# Patient Record
Sex: FEMALE | Race: WHITE | NOT HISPANIC OR LATINO | Employment: OTHER | URBAN - METROPOLITAN AREA
[De-identification: names, ages, dates, MRNs, and addresses within clinical notes are randomized per-mention and may not be internally consistent; named-entity substitution may affect disease eponyms.]

---

## 2024-09-29 ENCOUNTER — APPOINTMENT (OUTPATIENT)
Dept: CT IMAGING | Facility: CLINIC | Age: 69
DRG: 193 | End: 2024-09-29
Attending: STUDENT IN AN ORGANIZED HEALTH CARE EDUCATION/TRAINING PROGRAM
Payer: COMMERCIAL

## 2024-09-29 ENCOUNTER — HOSPITAL ENCOUNTER (INPATIENT)
Facility: CLINIC | Age: 69
LOS: 3 days | Discharge: HOME HEALTH | DRG: 193 | End: 2024-10-03
Attending: STUDENT IN AN ORGANIZED HEALTH CARE EDUCATION/TRAINING PROGRAM | Admitting: INTERNAL MEDICINE
Payer: COMMERCIAL

## 2024-09-29 DIAGNOSIS — A41.9 SEPSIS, DUE TO UNSPECIFIED ORGANISM, UNSPECIFIED WHETHER ACUTE ORGAN DYSFUNCTION PRESENT (H): ICD-10-CM

## 2024-09-29 DIAGNOSIS — R41.82 ALTERED MENTAL STATUS, UNSPECIFIED ALTERED MENTAL STATUS TYPE: ICD-10-CM

## 2024-09-29 DIAGNOSIS — Z96.651 S/P TOTAL KNEE ARTHROPLASTY, RIGHT: Primary | ICD-10-CM

## 2024-09-29 DIAGNOSIS — J18.9 COMMUNITY ACQUIRED PNEUMONIA OF LEFT UPPER LOBE OF LUNG: ICD-10-CM

## 2024-09-29 LAB
ALBUMIN SERPL BCG-MCNC: 4 G/DL (ref 3.5–5.2)
ALBUMIN UR-MCNC: NEGATIVE MG/DL
ALP SERPL-CCNC: 94 U/L (ref 40–150)
ALT SERPL W P-5'-P-CCNC: 12 U/L (ref 0–50)
ANION GAP SERPL CALCULATED.3IONS-SCNC: 15 MMOL/L (ref 7–15)
APPEARANCE UR: CLEAR
AST SERPL W P-5'-P-CCNC: 18 U/L (ref 0–45)
BASOPHILS # BLD AUTO: 0 10E3/UL (ref 0–0.2)
BASOPHILS NFR BLD AUTO: 1 %
BILIRUB SERPL-MCNC: 1.1 MG/DL
BILIRUB UR QL STRIP: NEGATIVE
BUN SERPL-MCNC: 13.8 MG/DL (ref 8–23)
CALCIUM SERPL-MCNC: 9.1 MG/DL (ref 8.8–10.4)
CHLORIDE SERPL-SCNC: 100 MMOL/L (ref 98–107)
COLOR UR AUTO: YELLOW
CREAT SERPL-MCNC: 0.85 MG/DL (ref 0.51–0.95)
CRP SERPL-MCNC: 53.02 MG/L
EGFRCR SERPLBLD CKD-EPI 2021: 73 ML/MIN/1.73M2
EOSINOPHIL # BLD AUTO: 0.2 10E3/UL (ref 0–0.7)
EOSINOPHIL NFR BLD AUTO: 3 %
ERYTHROCYTE [DISTWIDTH] IN BLOOD BY AUTOMATED COUNT: 12.7 % (ref 10–15)
ERYTHROCYTE [SEDIMENTATION RATE] IN BLOOD BY WESTERGREN METHOD: 42 MM/HR (ref 0–30)
GLUCOSE SERPL-MCNC: 155 MG/DL (ref 70–99)
GLUCOSE UR STRIP-MCNC: NEGATIVE MG/DL
HCO3 SERPL-SCNC: 23 MMOL/L (ref 22–29)
HCT VFR BLD AUTO: 36.7 % (ref 35–47)
HGB BLD-MCNC: 12.9 G/DL (ref 11.7–15.7)
HGB UR QL STRIP: ABNORMAL
HOLD SPECIMEN: NORMAL
HYALINE CASTS: 3 /LPF
IMM GRANULOCYTES # BLD: 0 10E3/UL
IMM GRANULOCYTES NFR BLD: 1 %
KETONES UR STRIP-MCNC: 5 MG/DL
LACTATE SERPL-SCNC: 2.7 MMOL/L (ref 0.7–2)
LEUKOCYTE ESTERASE UR QL STRIP: NEGATIVE
LYMPHOCYTES # BLD AUTO: 1.4 10E3/UL (ref 0.8–5.3)
LYMPHOCYTES NFR BLD AUTO: 18 %
MAGNESIUM SERPL-MCNC: 1.7 MG/DL (ref 1.7–2.3)
MCH RBC QN AUTO: 31.7 PG (ref 26.5–33)
MCHC RBC AUTO-ENTMCNC: 35.1 G/DL (ref 31.5–36.5)
MCV RBC AUTO: 90 FL (ref 78–100)
MONOCYTES # BLD AUTO: 0.5 10E3/UL (ref 0–1.3)
MONOCYTES NFR BLD AUTO: 7 %
MUCOUS THREADS #/AREA URNS LPF: PRESENT /LPF
NEUTROPHILS # BLD AUTO: 5.3 10E3/UL (ref 1.6–8.3)
NEUTROPHILS NFR BLD AUTO: 71 %
NITRATE UR QL: NEGATIVE
NRBC # BLD AUTO: 0 10E3/UL
NRBC BLD AUTO-RTO: 0 /100
PH UR STRIP: 7 [PH] (ref 5–7)
PLATELET # BLD AUTO: 261 10E3/UL (ref 150–450)
POTASSIUM SERPL-SCNC: 3.7 MMOL/L (ref 3.4–5.3)
PROT SERPL-MCNC: 6.9 G/DL (ref 6.4–8.3)
RBC # BLD AUTO: 4.07 10E6/UL (ref 3.8–5.2)
RBC URINE: 7 /HPF
SODIUM SERPL-SCNC: 138 MMOL/L (ref 135–145)
SP GR UR STRIP: 1.01 (ref 1–1.03)
TROPONIN T SERPL HS-MCNC: 8 NG/L
TSH SERPL DL<=0.005 MIU/L-ACNC: 3.24 UIU/ML (ref 0.3–4.2)
UROBILINOGEN UR STRIP-MCNC: 2 MG/DL
WBC # BLD AUTO: 7.5 10E3/UL (ref 4–11)
WBC URINE: 1 /HPF

## 2024-09-29 PROCEDURE — 99285 EMERGENCY DEPT VISIT HI MDM: CPT | Mod: 25 | Performed by: STUDENT IN AN ORGANIZED HEALTH CARE EDUCATION/TRAINING PROGRAM

## 2024-09-29 PROCEDURE — 80053 COMPREHEN METABOLIC PANEL: CPT | Performed by: STUDENT IN AN ORGANIZED HEALTH CARE EDUCATION/TRAINING PROGRAM

## 2024-09-29 PROCEDURE — 81001 URINALYSIS AUTO W/SCOPE: CPT | Performed by: STUDENT IN AN ORGANIZED HEALTH CARE EDUCATION/TRAINING PROGRAM

## 2024-09-29 PROCEDURE — 70450 CT HEAD/BRAIN W/O DYE: CPT

## 2024-09-29 PROCEDURE — 74177 CT ABD & PELVIS W/CONTRAST: CPT

## 2024-09-29 PROCEDURE — 250N000011 HC RX IP 250 OP 636: Performed by: FAMILY MEDICINE

## 2024-09-29 PROCEDURE — 84443 ASSAY THYROID STIM HORMONE: CPT | Performed by: STUDENT IN AN ORGANIZED HEALTH CARE EDUCATION/TRAINING PROGRAM

## 2024-09-29 PROCEDURE — 36415 COLL VENOUS BLD VENIPUNCTURE: CPT | Performed by: STUDENT IN AN ORGANIZED HEALTH CARE EDUCATION/TRAINING PROGRAM

## 2024-09-29 PROCEDURE — 83735 ASSAY OF MAGNESIUM: CPT | Performed by: STUDENT IN AN ORGANIZED HEALTH CARE EDUCATION/TRAINING PROGRAM

## 2024-09-29 PROCEDURE — 250N000009 HC RX 250: Performed by: STUDENT IN AN ORGANIZED HEALTH CARE EDUCATION/TRAINING PROGRAM

## 2024-09-29 PROCEDURE — 96375 TX/PRO/DX INJ NEW DRUG ADDON: CPT

## 2024-09-29 PROCEDURE — 83605 ASSAY OF LACTIC ACID: CPT | Performed by: STUDENT IN AN ORGANIZED HEALTH CARE EDUCATION/TRAINING PROGRAM

## 2024-09-29 PROCEDURE — 70496 CT ANGIOGRAPHY HEAD: CPT

## 2024-09-29 PROCEDURE — 99292 CRITICAL CARE ADDL 30 MIN: CPT

## 2024-09-29 PROCEDURE — 250N000011 HC RX IP 250 OP 636: Performed by: STUDENT IN AN ORGANIZED HEALTH CARE EDUCATION/TRAINING PROGRAM

## 2024-09-29 PROCEDURE — 96365 THER/PROPH/DIAG IV INF INIT: CPT | Mod: 59

## 2024-09-29 PROCEDURE — 99291 CRITICAL CARE FIRST HOUR: CPT | Mod: 25

## 2024-09-29 PROCEDURE — 85652 RBC SED RATE AUTOMATED: CPT | Performed by: STUDENT IN AN ORGANIZED HEALTH CARE EDUCATION/TRAINING PROGRAM

## 2024-09-29 PROCEDURE — 86140 C-REACTIVE PROTEIN: CPT | Performed by: STUDENT IN AN ORGANIZED HEALTH CARE EDUCATION/TRAINING PROGRAM

## 2024-09-29 PROCEDURE — 258N000003 HC RX IP 258 OP 636: Performed by: STUDENT IN AN ORGANIZED HEALTH CARE EDUCATION/TRAINING PROGRAM

## 2024-09-29 PROCEDURE — 84484 ASSAY OF TROPONIN QUANT: CPT | Performed by: STUDENT IN AN ORGANIZED HEALTH CARE EDUCATION/TRAINING PROGRAM

## 2024-09-29 PROCEDURE — 87040 BLOOD CULTURE FOR BACTERIA: CPT | Performed by: STUDENT IN AN ORGANIZED HEALTH CARE EDUCATION/TRAINING PROGRAM

## 2024-09-29 PROCEDURE — 85025 COMPLETE CBC W/AUTO DIFF WBC: CPT | Performed by: STUDENT IN AN ORGANIZED HEALTH CARE EDUCATION/TRAINING PROGRAM

## 2024-09-29 PROCEDURE — 96361 HYDRATE IV INFUSION ADD-ON: CPT

## 2024-09-29 PROCEDURE — 96376 TX/PRO/DX INJ SAME DRUG ADON: CPT

## 2024-09-29 RX ORDER — LORAZEPAM 2 MG/ML
1 INJECTION INTRAMUSCULAR ONCE
Status: COMPLETED | OUTPATIENT
Start: 2024-09-29 | End: 2024-09-29

## 2024-09-29 RX ORDER — METOPROLOL TARTRATE 1 MG/ML
5 INJECTION, SOLUTION INTRAVENOUS ONCE
Status: DISCONTINUED | OUTPATIENT
Start: 2024-09-29 | End: 2024-09-29

## 2024-09-29 RX ORDER — OXYCODONE HYDROCHLORIDE 5 MG/1
5 TABLET ORAL EVERY 6 HOURS PRN
Status: ON HOLD | COMMUNITY
Start: 2024-09-26 | End: 2024-10-03

## 2024-09-29 RX ORDER — ATORVASTATIN CALCIUM 20 MG/1
1 TABLET, FILM COATED ORAL AT BEDTIME
COMMUNITY
Start: 2024-09-09

## 2024-09-29 RX ORDER — IOPAMIDOL 755 MG/ML
500 INJECTION, SOLUTION INTRAVASCULAR ONCE
Status: COMPLETED | OUTPATIENT
Start: 2024-09-29 | End: 2024-09-29

## 2024-09-29 RX ORDER — AMLODIPINE BESYLATE 10 MG/1
1 TABLET ORAL DAILY
COMMUNITY
Start: 2024-09-09

## 2024-09-29 RX ORDER — PIPERACILLIN SODIUM, TAZOBACTAM SODIUM 4; .5 G/20ML; G/20ML
4.5 INJECTION, POWDER, LYOPHILIZED, FOR SOLUTION INTRAVENOUS EVERY 6 HOURS
Status: DISCONTINUED | OUTPATIENT
Start: 2024-09-29 | End: 2024-09-30

## 2024-09-29 RX ORDER — ASPIRIN 81 MG/1
81 TABLET ORAL DAILY
Status: ON HOLD | COMMUNITY
End: 2024-10-03

## 2024-09-29 RX ORDER — METOPROLOL SUCCINATE 100 MG/1
150 TABLET, EXTENDED RELEASE ORAL DAILY
COMMUNITY
Start: 2024-09-09

## 2024-09-29 RX ORDER — HYDROXYZINE HYDROCHLORIDE 25 MG/1
25 TABLET, FILM COATED ORAL EVERY 6 HOURS PRN
Status: ON HOLD | COMMUNITY
Start: 2024-09-26 | End: 2024-10-03

## 2024-09-29 RX ADMIN — PIPERACILLIN AND TAZOBACTAM 4.5 G: 4; .5 INJECTION, POWDER, FOR SOLUTION INTRAVENOUS at 23:31

## 2024-09-29 RX ADMIN — LORAZEPAM 1 MG: 2 INJECTION INTRAMUSCULAR; INTRAVENOUS at 22:05

## 2024-09-29 RX ADMIN — SODIUM CHLORIDE 1000 ML: 9 INJECTION, SOLUTION INTRAVENOUS at 22:51

## 2024-09-29 RX ADMIN — LORAZEPAM 1 MG: 2 INJECTION INTRAMUSCULAR; INTRAVENOUS at 23:28

## 2024-09-29 RX ADMIN — IOPAMIDOL 100 ML: 755 INJECTION, SOLUTION INTRAVENOUS at 22:53

## 2024-09-29 RX ADMIN — SODIUM CHLORIDE 100 ML: 9 INJECTION, SOLUTION INTRAVENOUS at 22:41

## 2024-09-29 ASSESSMENT — ACTIVITIES OF DAILY LIVING (ADL)
ADLS_ACUITY_SCORE: 35
ADLS_ACUITY_SCORE: 35

## 2024-09-29 ASSESSMENT — COLUMBIA-SUICIDE SEVERITY RATING SCALE - C-SSRS
2. HAVE YOU ACTUALLY HAD ANY THOUGHTS OF KILLING YOURSELF IN THE PAST MONTH?: NO
6. HAVE YOU EVER DONE ANYTHING, STARTED TO DO ANYTHING, OR PREPARED TO DO ANYTHING TO END YOUR LIFE?: NO
1. IN THE PAST MONTH, HAVE YOU WISHED YOU WERE DEAD OR WISHED YOU COULD GO TO SLEEP AND NOT WAKE UP?: NO

## 2024-09-29 NOTE — LETTER
Transition Communication Hand-off for Care Transitions to Next Level of Care Provider    Name: Greta Watts  : 1955  MRN #: [id number]  Primary Care Provider: Jose Gann     Primary Clinic: [address] [facility]  [place] [address]     Reason for Hospitalization:  Altered mental status, unspecified altered mental status type [R41.82]  Community acquired pneumonia of left upper lobe of lung [J18.9]  Sepsis, due to unspecified organism, unspecified whether acute organ dysfunction present (H) [A41.9]  Admit Date/Time: 2024  9:06 PM  Discharge Date: 10/3/24  Payor Source: Payor: [employer] / Plan: [employer] MEDICARE ADVANTAGE / Product Type: Medicare /            Reason for Communication Hand-off Referral: Fragility    Discharge Plan:  Discharge Plan:      Flowsheet Row Most Recent Value   Disposition Comments home with daugher and interim homecare             Concern for non-adherence with plan of care:   Y/N N  Discharge Needs Assessment:  Needs      Flowsheet Row Most Recent Value   Equipment Currently Used at Home walker, rolling, cane, straight, wheelchair, power  [uses walker and cane but has the power wheelchair.]   # of Referrals Placed by CM External Care Coordination, Homecare, Post Acute Facilities              Any outstanding tests or procedures:        Referrals       Future Labs/Procedures    Home Care Referral     Comments:    Your provider has ordered home health services. If you have not been contacted within 2 days of your discharge please call the selected Home Care agency listed on your Discharge document.  If a Home Care agency is NOT listed, please call [phone].              Maynard Recommendations:      Lian Valenzuela RN    AVS/Discharge Summary is the source of truth; this is a helpful guide for improved communication of patient story

## 2024-09-30 PROBLEM — R41.82 ALTERED MENTAL STATUS, UNSPECIFIED ALTERED MENTAL STATUS TYPE: Status: ACTIVE | Noted: 2024-09-30

## 2024-09-30 PROBLEM — A41.9 SEPSIS, DUE TO UNSPECIFIED ORGANISM, UNSPECIFIED WHETHER ACUTE ORGAN DYSFUNCTION PRESENT (H): Status: ACTIVE | Noted: 2024-09-30

## 2024-09-30 PROBLEM — J18.9 COMMUNITY ACQUIRED PNEUMONIA OF LEFT UPPER LOBE OF LUNG: Status: ACTIVE | Noted: 2024-09-30

## 2024-09-30 LAB
ANION GAP SERPL CALCULATED.3IONS-SCNC: 15 MMOL/L (ref 7–15)
BASE EXCESS BLDV CALC-SCNC: 1.1 MMOL/L (ref -3–3)
BUN SERPL-MCNC: 10.6 MG/DL (ref 8–23)
CALCIUM SERPL-MCNC: 8.5 MG/DL (ref 8.8–10.4)
CHLORIDE SERPL-SCNC: 100 MMOL/L (ref 98–107)
CREAT SERPL-MCNC: 0.74 MG/DL (ref 0.51–0.95)
EGFRCR SERPLBLD CKD-EPI 2021: 87 ML/MIN/1.73M2
ERYTHROCYTE [DISTWIDTH] IN BLOOD BY AUTOMATED COUNT: 12.8 % (ref 10–15)
GLUCOSE SERPL-MCNC: 113 MG/DL (ref 70–99)
HCO3 BLDV-SCNC: 26 MMOL/L (ref 21–28)
HCO3 SERPL-SCNC: 21 MMOL/L (ref 22–29)
HCT VFR BLD AUTO: 32.7 % (ref 35–47)
HGB BLD-MCNC: 11.3 G/DL (ref 11.7–15.7)
LACTATE SERPL-SCNC: 1.4 MMOL/L (ref 0.7–2)
MCH RBC QN AUTO: 31.9 PG (ref 26.5–33)
MCHC RBC AUTO-ENTMCNC: 34.6 G/DL (ref 31.5–36.5)
MCV RBC AUTO: 92 FL (ref 78–100)
O2/TOTAL GAS SETTING VFR VENT: 28 %
OXYHGB MFR BLDV: 61 % (ref 70–75)
PCO2 BLDV: 44 MM HG (ref 40–50)
PH BLDV: 7.39 [PH] (ref 7.32–7.43)
PLATELET # BLD AUTO: 211 10E3/UL (ref 150–450)
PO2 BLDV: 33 MM HG (ref 25–47)
POTASSIUM SERPL-SCNC: 3.8 MMOL/L (ref 3.4–5.3)
PROCALCITONIN SERPL IA-MCNC: 0.09 NG/ML
RADIOLOGIST FLAGS: NORMAL
RBC # BLD AUTO: 3.54 10E6/UL (ref 3.8–5.2)
SAO2 % BLDV: 62 % (ref 70–75)
SODIUM SERPL-SCNC: 136 MMOL/L (ref 135–145)
TROPONIN T SERPL HS-MCNC: 7 NG/L
WBC # BLD AUTO: 6.4 10E3/UL (ref 4–11)

## 2024-09-30 PROCEDURE — 250N000011 HC RX IP 250 OP 636: Performed by: INTERNAL MEDICINE

## 2024-09-30 PROCEDURE — 84484 ASSAY OF TROPONIN QUANT: CPT | Performed by: STUDENT IN AN ORGANIZED HEALTH CARE EDUCATION/TRAINING PROGRAM

## 2024-09-30 PROCEDURE — 250N000013 HC RX MED GY IP 250 OP 250 PS 637: Performed by: NURSE PRACTITIONER

## 2024-09-30 PROCEDURE — 83605 ASSAY OF LACTIC ACID: CPT | Performed by: STUDENT IN AN ORGANIZED HEALTH CARE EDUCATION/TRAINING PROGRAM

## 2024-09-30 PROCEDURE — 82805 BLOOD GASES W/O2 SATURATION: CPT | Performed by: NURSE PRACTITIONER

## 2024-09-30 PROCEDURE — 250N000013 HC RX MED GY IP 250 OP 250 PS 637: Performed by: FAMILY MEDICINE

## 2024-09-30 PROCEDURE — 82310 ASSAY OF CALCIUM: CPT | Performed by: INTERNAL MEDICINE

## 2024-09-30 PROCEDURE — 94660 CPAP INITIATION&MGMT: CPT

## 2024-09-30 PROCEDURE — 80048 BASIC METABOLIC PNL TOTAL CA: CPT | Performed by: INTERNAL MEDICINE

## 2024-09-30 PROCEDURE — 85014 HEMATOCRIT: CPT | Performed by: INTERNAL MEDICINE

## 2024-09-30 PROCEDURE — 120N000001 HC R&B MED SURG/OB

## 2024-09-30 PROCEDURE — 258N000003 HC RX IP 258 OP 636: Performed by: INTERNAL MEDICINE

## 2024-09-30 PROCEDURE — 36415 COLL VENOUS BLD VENIPUNCTURE: CPT | Performed by: INTERNAL MEDICINE

## 2024-09-30 PROCEDURE — 250N000013 HC RX MED GY IP 250 OP 250 PS 637: Performed by: INTERNAL MEDICINE

## 2024-09-30 PROCEDURE — 258N000003 HC RX IP 258 OP 636: Performed by: FAMILY MEDICINE

## 2024-09-30 PROCEDURE — 250N000011 HC RX IP 250 OP 636: Performed by: FAMILY MEDICINE

## 2024-09-30 PROCEDURE — 999N000157 HC STATISTIC RCP TIME EA 10 MIN

## 2024-09-30 PROCEDURE — 84145 PROCALCITONIN (PCT): CPT | Performed by: NURSE PRACTITIONER

## 2024-09-30 PROCEDURE — 99207 PR NOT IN PERSON INPATIENT CONSULT STATISTICAL MARKER: CPT | Performed by: INTERNAL MEDICINE

## 2024-09-30 PROCEDURE — 99207 PR NO BILLABLE SERVICE THIS VISIT: CPT | Performed by: NURSE PRACTITIONER

## 2024-09-30 PROCEDURE — 250N000011 HC RX IP 250 OP 636: Performed by: NURSE PRACTITIONER

## 2024-09-30 PROCEDURE — 36415 COLL VENOUS BLD VENIPUNCTURE: CPT | Performed by: NURSE PRACTITIONER

## 2024-09-30 PROCEDURE — 36415 COLL VENOUS BLD VENIPUNCTURE: CPT | Performed by: STUDENT IN AN ORGANIZED HEALTH CARE EDUCATION/TRAINING PROGRAM

## 2024-09-30 RX ORDER — AMOXICILLIN 250 MG
1 CAPSULE ORAL 2 TIMES DAILY PRN
Status: DISCONTINUED | OUTPATIENT
Start: 2024-09-30 | End: 2024-10-03 | Stop reason: HOSPADM

## 2024-09-30 RX ORDER — CEFTRIAXONE 2 G/1
2 INJECTION, POWDER, FOR SOLUTION INTRAMUSCULAR; INTRAVENOUS EVERY 24 HOURS
Status: DISCONTINUED | OUTPATIENT
Start: 2024-09-30 | End: 2024-10-02

## 2024-09-30 RX ORDER — AMOXICILLIN 250 MG
2 CAPSULE ORAL 2 TIMES DAILY PRN
Status: DISCONTINUED | OUTPATIENT
Start: 2024-09-30 | End: 2024-10-03 | Stop reason: HOSPADM

## 2024-09-30 RX ORDER — CALCIUM CARBONATE 500 MG/1
1000 TABLET, CHEWABLE ORAL 4 TIMES DAILY PRN
Status: DISCONTINUED | OUTPATIENT
Start: 2024-09-30 | End: 2024-09-30

## 2024-09-30 RX ORDER — LIDOCAINE 40 MG/G
CREAM TOPICAL
Status: DISCONTINUED | OUTPATIENT
Start: 2024-09-30 | End: 2024-10-03 | Stop reason: HOSPADM

## 2024-09-30 RX ORDER — ENOXAPARIN SODIUM 100 MG/ML
40 INJECTION SUBCUTANEOUS EVERY 24 HOURS
Status: DISCONTINUED | OUTPATIENT
Start: 2024-09-30 | End: 2024-10-02

## 2024-09-30 RX ORDER — SENNOSIDES 8.6 MG
1 TABLET ORAL 2 TIMES DAILY PRN
Status: ON HOLD | COMMUNITY
End: 2024-10-01

## 2024-09-30 RX ORDER — OXYCODONE HYDROCHLORIDE 5 MG/1
5 TABLET ORAL EVERY 4 HOURS PRN
Status: DISCONTINUED | OUTPATIENT
Start: 2024-09-30 | End: 2024-10-01

## 2024-09-30 RX ORDER — ASPIRIN 325 MG
325 TABLET ORAL 2 TIMES DAILY
Status: ON HOLD | COMMUNITY
Start: 2024-09-26 | End: 2024-10-03

## 2024-09-30 RX ORDER — CALCIUM CARBONATE 500 MG/1
1000 TABLET, CHEWABLE ORAL 4 TIMES DAILY PRN
Status: DISCONTINUED | OUTPATIENT
Start: 2024-09-30 | End: 2024-10-03 | Stop reason: HOSPADM

## 2024-09-30 RX ORDER — METOPROLOL SUCCINATE 50 MG/1
150 TABLET, EXTENDED RELEASE ORAL DAILY
Status: DISCONTINUED | OUTPATIENT
Start: 2024-09-30 | End: 2024-10-03 | Stop reason: HOSPADM

## 2024-09-30 RX ORDER — POLYETHYLENE GLYCOL 3350 17 G/17G
1 POWDER, FOR SOLUTION ORAL DAILY
Status: ON HOLD | COMMUNITY
End: 2024-10-01

## 2024-09-30 RX ORDER — NALOXONE HYDROCHLORIDE 0.4 MG/ML
0.2 INJECTION, SOLUTION INTRAMUSCULAR; INTRAVENOUS; SUBCUTANEOUS
Status: DISCONTINUED | OUTPATIENT
Start: 2024-09-30 | End: 2024-10-03 | Stop reason: HOSPADM

## 2024-09-30 RX ORDER — ONDANSETRON 4 MG/1
4 TABLET, ORALLY DISINTEGRATING ORAL EVERY 6 HOURS PRN
Status: DISCONTINUED | OUTPATIENT
Start: 2024-09-30 | End: 2024-10-03 | Stop reason: HOSPADM

## 2024-09-30 RX ORDER — SODIUM CHLORIDE 9 MG/ML
INJECTION, SOLUTION INTRAVENOUS CONTINUOUS
Status: DISCONTINUED | OUTPATIENT
Start: 2024-09-30 | End: 2024-10-02

## 2024-09-30 RX ORDER — ATORVASTATIN CALCIUM 10 MG/1
20 TABLET, FILM COATED ORAL AT BEDTIME
Status: DISCONTINUED | OUTPATIENT
Start: 2024-09-30 | End: 2024-10-03 | Stop reason: HOSPADM

## 2024-09-30 RX ORDER — MORPHINE SULFATE 4 MG/ML
1 INJECTION, SOLUTION INTRAMUSCULAR; INTRAVENOUS
Status: DISCONTINUED | OUTPATIENT
Start: 2024-09-30 | End: 2024-10-01

## 2024-09-30 RX ORDER — AZITHROMYCIN 500 MG/1
500 INJECTION, POWDER, LYOPHILIZED, FOR SOLUTION INTRAVENOUS EVERY 24 HOURS
Status: DISCONTINUED | OUTPATIENT
Start: 2024-09-30 | End: 2024-10-02

## 2024-09-30 RX ORDER — NALOXONE HYDROCHLORIDE 0.4 MG/ML
0.4 INJECTION, SOLUTION INTRAMUSCULAR; INTRAVENOUS; SUBCUTANEOUS
Status: DISCONTINUED | OUTPATIENT
Start: 2024-09-30 | End: 2024-10-03 | Stop reason: HOSPADM

## 2024-09-30 RX ORDER — ASPIRIN 81 MG/1
81 TABLET ORAL DAILY
Status: DISCONTINUED | OUTPATIENT
Start: 2024-09-30 | End: 2024-10-02

## 2024-09-30 RX ORDER — ONDANSETRON 2 MG/ML
4 INJECTION INTRAMUSCULAR; INTRAVENOUS EVERY 6 HOURS PRN
Status: DISCONTINUED | OUTPATIENT
Start: 2024-09-30 | End: 2024-10-03 | Stop reason: HOSPADM

## 2024-09-30 RX ORDER — HYDROXYZINE HYDROCHLORIDE 25 MG/1
25 TABLET, FILM COATED ORAL EVERY 6 HOURS PRN
Status: DISCONTINUED | OUTPATIENT
Start: 2024-09-30 | End: 2024-10-01

## 2024-09-30 RX ORDER — AMLODIPINE BESYLATE 5 MG/1
10 TABLET ORAL DAILY
Status: DISCONTINUED | OUTPATIENT
Start: 2024-09-30 | End: 2024-10-03 | Stop reason: HOSPADM

## 2024-09-30 RX ORDER — ACETAMINOPHEN 325 MG/1
650 TABLET ORAL EVERY 4 HOURS PRN
Status: DISCONTINUED | OUTPATIENT
Start: 2024-09-30 | End: 2024-10-02

## 2024-09-30 RX ORDER — METOPROLOL SUCCINATE 50 MG/1
150 TABLET, EXTENDED RELEASE ORAL DAILY
Status: DISCONTINUED | OUTPATIENT
Start: 2024-09-30 | End: 2024-09-30

## 2024-09-30 RX ADMIN — ONDANSETRON 4 MG: 2 INJECTION INTRAMUSCULAR; INTRAVENOUS at 04:59

## 2024-09-30 RX ADMIN — PIPERACILLIN AND TAZOBACTAM 4.5 G: 4; .5 INJECTION, POWDER, FOR SOLUTION INTRAVENOUS at 15:59

## 2024-09-30 RX ADMIN — METOPROLOL SUCCINATE 150 MG: 50 TABLET, EXTENDED RELEASE ORAL at 17:49

## 2024-09-30 RX ADMIN — CEFTRIAXONE SODIUM 2 G: 2 INJECTION, POWDER, FOR SOLUTION INTRAMUSCULAR; INTRAVENOUS at 17:17

## 2024-09-30 RX ADMIN — PIPERACILLIN AND TAZOBACTAM 4.5 G: 4; .5 INJECTION, POWDER, FOR SOLUTION INTRAVENOUS at 04:34

## 2024-09-30 RX ADMIN — SODIUM CHLORIDE: 9 INJECTION, SOLUTION INTRAVENOUS at 09:55

## 2024-09-30 RX ADMIN — ENOXAPARIN SODIUM 40 MG: 40 INJECTION SUBCUTANEOUS at 09:58

## 2024-09-30 RX ADMIN — ASPIRIN 81 MG: 81 TABLET, COATED ORAL at 09:58

## 2024-09-30 RX ADMIN — METOPROLOL SUCCINATE 150 MG: 50 TABLET, EXTENDED RELEASE ORAL at 09:58

## 2024-09-30 RX ADMIN — PIPERACILLIN AND TAZOBACTAM 4.5 G: 4; .5 INJECTION, POWDER, FOR SOLUTION INTRAVENOUS at 09:55

## 2024-09-30 RX ADMIN — AZITHROMYCIN MONOHYDRATE 500 MG: 500 INJECTION, POWDER, LYOPHILIZED, FOR SOLUTION INTRAVENOUS at 18:02

## 2024-09-30 RX ADMIN — SODIUM CHLORIDE 250 ML: 9 INJECTION, SOLUTION INTRAVENOUS at 04:31

## 2024-09-30 RX ADMIN — ATORVASTATIN CALCIUM 20 MG: 10 TABLET, FILM COATED ORAL at 21:10

## 2024-09-30 RX ADMIN — SODIUM CHLORIDE: 9 INJECTION, SOLUTION INTRAVENOUS at 02:20

## 2024-09-30 RX ADMIN — HYDROXYZINE HYDROCHLORIDE 25 MG: 25 TABLET ORAL at 04:34

## 2024-09-30 RX ADMIN — AMLODIPINE BESYLATE 10 MG: 5 TABLET ORAL at 09:58

## 2024-09-30 RX ADMIN — ACETAMINOPHEN 650 MG: 325 TABLET ORAL at 17:49

## 2024-09-30 ASSESSMENT — ACTIVITIES OF DAILY LIVING (ADL)
ADLS_ACUITY_SCORE: 39
ADLS_ACUITY_SCORE: 39
ADLS_ACUITY_SCORE: 26
ADLS_ACUITY_SCORE: 26
ADLS_ACUITY_SCORE: 35
VISION_MANAGEMENT: GLASSES
ADLS_ACUITY_SCORE: 35
EQUIPMENT_CURRENTLY_USED_AT_HOME: WALKER, ROLLING
CONCENTRATING,_REMEMBERING_OR_MAKING_DECISIONS_DIFFICULTY: NO
WALKING_OR_CLIMBING_STAIRS_DIFFICULTY: YES
ADLS_ACUITY_SCORE: 35
DIFFICULTY_COMMUNICATING: NO
TOILETING_ISSUES: NO
DRESSING/BATHING_DIFFICULTY: YES
CHANGE_IN_FUNCTIONAL_STATUS_SINCE_ONSET_OF_CURRENT_ILLNESS/INJURY: YES
ADLS_ACUITY_SCORE: 26
ADLS_ACUITY_SCORE: 35
WALKING_OR_CLIMBING_STAIRS: AMBULATION DIFFICULTY, REQUIRES EQUIPMENT
FALL_HISTORY_WITHIN_LAST_SIX_MONTHS: NO
ADLS_ACUITY_SCORE: 39
HEARING_DIFFICULTY_OR_DEAF: NO
ADLS_ACUITY_SCORE: 26
ADLS_ACUITY_SCORE: 39
ADLS_ACUITY_SCORE: 35
ADLS_ACUITY_SCORE: 35
ADLS_ACUITY_SCORE: 26
DRESSING/BATHING: DRESSING DIFFICULTY, ASSISTANCE 1 PERSON
ADLS_ACUITY_SCORE: 39
WEAR_GLASSES_OR_BLIND: YES
DOING_ERRANDS_INDEPENDENTLY_DIFFICULTY: NO
ADLS_ACUITY_SCORE: 39
ADLS_ACUITY_SCORE: 35
DIFFICULTY_EATING/SWALLOWING: NO
ADLS_ACUITY_SCORE: 35

## 2024-09-30 NOTE — ED PROVIDER NOTES
History     Chief Complaint   Patient presents with    Altered Mental Status     HPI  Greta Watts is a 68 year old female who presents with confusion.  Patient's daughter at bedside notes that she recent had a knee replacement 3 days ago.  Shortly after discharge patient the following day started having some confusion.  He has been slowly getting worse for the past 2 days but today has been significantly worse.  She is unsure that she has had any fevers that she has not had any nausea or vomiting or diarrhea.  She has not had any significant bowel movements at all since surgery.  She denies any urinary changes.  Her knee looks otherwise no different than wanted after the surgery.  Daughter does note that she has not given her any of her medications that she was supposed to take for her blood pressure including amlodipine and metoprolol.  She is not sure that she has had any neurological change but may be some right-sided facial abnormalities but unsure as well.    Allergies:  No Known Allergies    Problem List:    There are no problems to display for this patient.       Past Medical History:    No past medical history on file.    Past Surgical History:    No past surgical history on file.    Family History:    No family history on file.    Social History:  Marital Status:   [4]        Medications:    amLODIPine (NORVASC) 10 MG tablet  atorvastatin (LIPITOR) 20 MG tablet  hydrOXYzine HCl (ATARAX) 25 MG tablet  metoprolol succinate ER (TOPROL XL) 100 MG 24 hr tablet  oxyCODONE (ROXICODONE) 5 MG tablet  aspirin 81 MG EC tablet          Review of Systems   Neurological:         Altered mental status   All other systems reviewed and are negative.      Physical Exam   BP: (!) 153/110  Pulse: (!) 136  Temp: 98.1  F (36.7  C)  Resp: 20  Weight: 73.2 kg (161 lb 6.4 oz)  SpO2: 96 %      Physical Exam  Vitals and nursing note reviewed.   Constitutional:       General: She is not in acute distress.     Comments:  Patient continues to stare off into the distance and mostly to the right side.  She is confused but able to answer questions such as her name and who is in the room but believes it is 1972 and unable to tell us who the president is or what location she is send aside from that she is in the hospital and I am the doctor.   HENT:      Head: Normocephalic.   Eyes:      Extraocular Movements: Extraocular movements intact.      Pupils: Pupils are equal, round, and reactive to light.   Pulmonary:      Effort: Pulmonary effort is normal.      Breath sounds: Normal breath sounds.   Abdominal:      General: Bowel sounds are normal.      Palpations: Abdomen is soft.   Musculoskeletal:         General: Normal range of motion.      Comments: Knee seems well-appearing swollen but does not look erythematous and no signs of active discharge or warmth.   Skin:     General: Skin is warm and dry.      Capillary Refill: Capillary refill takes 2 to 3 seconds.   Neurological:      Mental Status: She is alert. She is disoriented and confused.      Cranial Nerves: No cranial nerve deficit.      Sensory: No sensory deficit.      Comments: Unable to follow commands         ED Course        Procedures             Results for orders placed or performed during the hospital encounter of 09/29/24 (from the past 24 hour(s))   CBC with platelets differential    Narrative    The following orders were created for panel order CBC with platelets differential.  Procedure                               Abnormality         Status                     ---------                               -----------         ------                     CBC with platelets and d...[[id number]]                      Final result                 Please view results for these tests on the individual orders.   Comprehensive metabolic panel   Result Value Ref Range    Sodium 138 135 - 145 mmol/L    Potassium 3.7 3.4 - 5.3 mmol/L    Carbon Dioxide (CO2) 23 22 - 29 mmol/L    Anion Gap  15 7 - 15 mmol/L    Urea Nitrogen 13.8 8.0 - 23.0 mg/dL    Creatinine 0.85 0.51 - 0.95 mg/dL    GFR Estimate 73 >60 mL/min/1.73m2    Calcium 9.1 8.8 - 10.4 mg/dL    Chloride 100 98 - 107 mmol/L    Glucose 155 (H) 70 - 99 mg/dL    Alkaline Phosphatase 94 40 - 150 U/L    AST 18 0 - 45 U/L    ALT 12 0 - 50 U/L    Protein Total 6.9 6.4 - 8.3 g/dL    Albumin 4.0 3.5 - 5.2 g/dL    Bilirubin Total 1.1 <=1.2 mg/dL   Troponin T, High Sensitivity   Result Value Ref Range    Troponin T, High Sensitivity 8 <=14 ng/L   [place] Draw    Narrative    The following orders were created for panel order [place] Draw.  Procedure                               Abnormality         Status                     ---------                               -----------         ------                     Extra Blue Top Tube[[id number]]                              In process                   Please view results for these tests on the individual orders.   CBC with platelets and differential   Result Value Ref Range    WBC Count 7.5 4.0 - 11.0 10e3/uL    RBC Count 4.07 3.80 - 5.20 10e6/uL    Hemoglobin 12.9 11.7 - 15.7 g/dL    Hematocrit 36.7 35.0 - 47.0 %    MCV 90 78 - 100 fL    MCH 31.7 26.5 - 33.0 pg    MCHC 35.1 31.5 - 36.5 g/dL    RDW 12.7 10.0 - 15.0 %    Platelet Count 261 150 - 450 10e3/uL    % Neutrophils 71 %    % Lymphocytes 18 %    % Monocytes 7 %    % Eosinophils 3 %    % Basophils 1 %    % Immature Granulocytes 1 %    NRBCs per 100 WBC 0 <1 /100    Absolute Neutrophils 5.3 1.6 - 8.3 10e3/uL    Absolute Lymphocytes 1.4 0.8 - 5.3 10e3/uL    Absolute Monocytes 0.5 0.0 - 1.3 10e3/uL    Absolute Eosinophils 0.2 0.0 - 0.7 10e3/uL    Absolute Basophils 0.0 0.0 - 0.2 10e3/uL    Absolute Immature Granulocytes 0.0 <=0.4 10e3/uL    Absolute NRBCs 0.0 10e3/uL   TSH with free T4 reflex   Result Value Ref Range    TSH 3.24 0.30 - 4.20 uIU/mL   Magnesium   Result Value Ref Range    Magnesium 1.7 1.7 - 2.3 mg/dL   Erythrocyte sedimentation rate auto    Result Value Ref Range    Erythrocyte Sedimentation Rate 42 (H) 0 - 30 mm/hr   CRP inflammation   Result Value Ref Range    CRP Inflammation 53.02 (H) <5.00 mg/L   UA with Microscopic reflex to Culture    Specimen: Urine, Catheter   Result Value Ref Range    Color Urine Yellow Colorless, Straw, Light Yellow, Yellow    Appearance Urine Clear Clear    Glucose Urine Negative Negative mg/dL    Bilirubin Urine Negative Negative    Ketones Urine 5 (A) Negative mg/dL    Specific Gravity Urine 1.014 1.003 - 1.035    Blood Urine Moderate (A) Negative    pH Urine 7.0 5.0 - 7.0    Protein Albumin Urine Negative Negative mg/dL    Urobilinogen Urine 2.0 Normal, 2.0 mg/dL    Nitrite Urine Negative Negative    Leukocyte Esterase Urine Negative Negative    Mucus Urine Present (A) None Seen /LPF    RBC Urine 7 (H) <=2 /HPF    WBC Urine 1 <=5 /HPF    Hyaline Casts Urine 3 (H) <=2 /LPF    Narrative    Urine Culture not indicated       Medications   sodium chloride 0.9% BOLUS 1,000 mL (has no administration in time range)   iopamidol (ISOVUE-370) solution 500 mL (has no administration in time range)   sodium chloride 0.9 % bag 100mL for CT scan flush use (has no administration in time range)   LORazepam (ATIVAN) injection 1 mg (has no administration in time range)   LORazepam (ATIVAN) injection 1 mg (1 mg Intravenous $Given 9/29/24 2630)       Assessments & Plan (with Medical Decision Making)     I have reviewed the nursing notes.    I have reviewed the findings, diagnosis, plan and need for follow up with the patient.      Medical Decision Making  68-year-old female presenting with altered mental status for the past 2 days worse today.  Currently taking oxycodone but has not taken any of her blood pressure medications.  Her heart rate to 134, blood pressure is 164/107.  Her temperature is 98.1 with an oxygen saturation of 96% on room air.  Her lungs are clear bilaterally.  Her abdomen is soft throughout.  Her knee looks well  appearing without signs of active infection.  Concern for stroke versus infection versus medication side effect versus delirium versus dehydration versus lack of heart rate control.  Patient's ESR and CRP are mildly elevated at 53 CRP and ESR of 42.  Her white cell count is stable at 7.5 and reassuring.  Her hemoglobin is 12.9 and a lactic of 2.7.  Patient is provided a liter of fluids.  Her repeat ordered.  Her TSH is at 3.24 magnesium 1.7.  Her urinalysis is without significant signs of UTI.  Her CMP is reassuring with a glucose of 155.    At this time do not have a source to patient's confusion and patient is provided with 1 mg of Ativan to help get CT imaging of head and neck chest abdomen pelvis.    Case discussed with Dr. Physician Dr. Orozco will follow-up on imaging and further disposition planning.    New Prescriptions    No medications on file       Final diagnoses:   None       9/29/2024   [facility] EMERGENCY DEPT       Toro Templeton MD  10/01/24 [id number]

## 2024-09-30 NOTE — PROGRESS NOTES
Called by RN at 0225 due to pt having a desat in the 70's while on 4L NC and asleep. MD conference called and informed me pt has MARKUS but refuses to wear CPAP per PCP. Trialed pt on hospital's CPAP 6-16 Auto Titrate with 6L 02 bled in but pt was confused and constantly trying to remove mask via confused jason chi. Placed back to 8L oxymask at 0320 due to intolerance.

## 2024-09-30 NOTE — ED NOTES
Patient's daughter and patient initially stated patient is not allergic to medicines. Patient's [facility] chart also states no known allergies. However patient has allergy alert bracelet from [email or web address] [facility]. Patient's other daughter called and stated at one point patient had said she is allergic to penicillins, the reaction type is unknown. Stopped Zosyn administration and advised provider. Patient is stable and monitored at this time.

## 2024-09-30 NOTE — H&P
HOSPITALIST TELEMED ADMISSION H&P    Service Date : 9/30/2024  Dr. Naz CANTU am located in [place]  Greta Watts is located in [place] at [facility]     R[facility]aria Isabel, ICU ([facility]  is assisting me today with this patient.    chief complaint: increased confusion    History of Present Illness:  Greta Watts is a 68 year old female  S/P right total knee replacement (09/25/2024) , MARKUS, hypertension, hyperlipidemia presenting to ED with altered mental status that per the daughters began the day following her knee surgery and has gotten progressively worse.  Patient was sleepy and confused throughout the examination. Information obtained from ED provider note and nurse.     Emergency Room Course - in the emergency room, serologies for CMP, CBC,  magnesium, trop, TSH    Lactic acid #1 = 2.7  Lactic acid #2 = 1.4  ESR = 42  CRP = 53.02         EKG: sinus tachycardia with rate of  132, no acute ST-T wave changes noted     Radiological diagnostics included:        Narrative & Impression   EXAM: CT CHEST/ABDOMEN/PELVIS W CONTRAST  LOCATION: [facility]  DATE: 9/29/2024     INDICATION: Altered mental status. Total knee replacement. Strange behavior. Progressive confusion. Evaluate for source.  COMPARISON: None.  TECHNIQUE: CT scan of the chest, abdomen, and pelvis was performed following injection of IV contrast. Multiplanar reformats were obtained. Dose reduction techniques were used.   CONTRAST: 100 mL Isovue 370.     FINDINGS:   LUNGS AND PLEURA: Patchy alveolar infiltrate posterior left upper lobe compatible with left upper lobe pneumonia. 8 mm nodule subpleural aspect right lower lobe posteriorly (5/186). Minimal linear atelectasis both lung bases.     MEDIASTINUM/AXILLAE: Normal caliber thoracic aorta without dissection. Minimal aortic calcification. Right brachiocephalic, left common carotid, and left subclavian arteries patent. Patent bilateral common  carotid and vertebral arteries. No large central   pulmonary embolism. Peripheral pulmonary arteries unable to be evaluated due to phase of contrast opacification. Minimal sliding esophageal hiatal hernia. No pericardial fluid or lymphadenopathy.     CORONARY ARTERY CALCIFICATION: Mild.     HEPATOBILIARY: Normal.     PANCREAS: Normal.     SPLEEN: Normal.     ADRENAL GLANDS: Normal.     KIDNEYS/BLADDER: Symmetric renal enhancement. Symmetric contrast excretion without evidence for obstruction. Bladder unremarkable.     BOWEL: No obstruction or inflammatory change.     LYMPH NODES: No lymphadenopathy.     VASCULATURE: Normal caliber abdominal aorta without evidence for dissection. Patent celiac, SMA, bilateral renal arteries and LAURY. Moderate vascular calcification.     PELVIC ORGANS: 2.8 cm right adnexal cyst. No free fluid.     MUSCULOSKELETAL: Moderate degenerative osteoporosis left hip. Minimal degenerative changes right hip. Marked lower lumbar facet arthropathy.                                                                      IMPRESSION:  1.  Patchy alveolar infiltrate posterior left upper lobe compatible with pneumonia.     2.  Right lower lobe subpleural pulmonary nodule in the posterior costophrenic angle with mean diameter of 8 mm. Continued CT follow-up as detailed below.     3.  Normal caliber thoracic and abdominal aorta without dissection. Branch vessels remain patent.     4.  Minimal sliding esophageal hiatal hernia.     5.  No central pulmonary embolism. Peripheral pulmonary arteries unable to be evaluated due to phase of contrast opacification.     6.  2.8 cm right adnexal cyst. Management recommendations as detailed below.     REFERENCE:  Guidelines for Management of Incidental Pulmonary Nodules Detected on CT Images: From the Fleischner Society 2017.   Guidelines apply to incidental nodules in patients who are 35 years or older.  Guidelines do not apply to lung cancer screening, patients with  immunosuppression, or patients with known primary cancer.     SINGLE NODULE     Nodule size 6-8 mm  Low-risk patients: Follow-up CT at 6-12 months, then consider CT at 18-24 months.  High-risk patients: Follow-up CT at 6-12 months, then at 18-24 months if no change.     REFERENCE:  Management of Incidental Adnexal Findings on CT and MRI: A White Paper of the ACR Incidental Findings Committee. J Am Tamiko Radiol 2020; 17(2):248-254.     Postmenopausal or equal to or >50 years if status unknown:     Equal to or <3 cm: No further imaging.     [Recommend Follow Up: Pulmonary nodule]     This report will be copied to the [facility] to ensure a provider acknowledges the finding.         Narrative & Impression   EXAM: CTA HEAD NECK W CONTRAST, CT HEAD W/O CONTRAST  LOCATION: [facility]  DATE: 9/29/2024     INDICATION: AMSX 3 days.  COMPARISON: None.  CONTRAST: 100 mL Isovue 370.  TECHNIQUE: Head and neck CT angiogram with IV contrast. Noncontrast head CT followed by axial helical CT images of the head and neck vessels obtained during the arterial phase of intravenous contrast administration. Axial 2D reconstructed images and   multiplanar 3D MIP reconstructed images of the head and neck vessels were performed by the technologist. Dose reduction techniques were used. All stenosis measurements made according to NASCET criteria unless otherwise specified.     FINDINGS:   NONCONTRAST HEAD CT:   INTRACRANIAL CONTENTS: No intracranial hemorrhage, extraaxial collection, or mass effect. No CT evidence of acute infarct. Mild presumed chronic small vessel ischemic changes. Mild generalized volume loss. No hydrocephalus.      VISUALIZED ORBITS/SINUSES/MASTOIDS: No intraorbital abnormality. No paranasal sinus mucosal disease. No middle ear or mastoid effusion.     BONES/SOFT TISSUES: No acute abnormality.     HEAD CTA:  ANTERIOR CIRCULATION: No stenosis/occlusion, aneurysm, or high flow  vascular malformation. Mild atheromatous changes in the carotid siphons. Standard [place] of [place]odd anatomy.     POSTERIOR CIRCULATION: No stenosis/occlusion, aneurysm, or high flow vascular malformation. Dominant left and smaller right vertebral artery contribute to a normal basilar artery.      DURAL VENOUS SINUSES: Expected enhancement of the major dural venous sinuses.     NECK CTA:  RIGHT CAROTID: Mild atheromatous change. No measurable stenosis or dissection.     LEFT CAROTID: Mild atheromatous change. No measurable stenosis or dissection.     VERTEBRAL ARTERIES: No focal stenosis or dissection. Dominant left and smaller right vertebral arteries.     AORTIC ARCH: Classic aortic arch anatomy with no significant stenosis at the origin of the great vessels.     NONVASCULAR STRUCTURES: Unremarkable.                                                                      IMPRESSION:   HEAD CT:  1.  No CT evidence for acute intracranial process.  2.  Brain atrophy and presumed chronic microvascular ischemic changes as above.     HEAD CTA:   1.  No large vessel occlusion or flow-limiting stenosis.  2.  Mild atheromatous changes in the carotid siphons.     NECK CTA:  1.  No measurable stenosis or dissection.              Narrative & Impression   EXAM: CTA HEAD NECK W CONTRAST, CT HEAD W/O CONTRAST  LOCATION: [facility]  DATE: 9/29/2024     INDICATION: AMSX 3 days.  COMPARISON: None.  CONTRAST: 100 mL Isovue 370.  TECHNIQUE: Head and neck CT angiogram with IV contrast. Noncontrast head CT followed by axial helical CT images of the head and neck vessels obtained during the arterial phase of intravenous contrast administration. Axial 2D reconstructed images and   multiplanar 3D MIP reconstructed images of the head and neck vessels were performed by the technologist. Dose reduction techniques were used. All stenosis measurements made according to NASCET criteria unless otherwise specified.      FINDINGS:   NONCONTRAST HEAD CT:   INTRACRANIAL CONTENTS: No intracranial hemorrhage, extraaxial collection, or mass effect. No CT evidence of acute infarct. Mild presumed chronic small vessel ischemic changes. Mild generalized volume loss. No hydrocephalus.      VISUALIZED ORBITS/SINUSES/MASTOIDS: No intraorbital abnormality. No paranasal sinus mucosal disease. No middle ear or mastoid effusion.     BONES/SOFT TISSUES: No acute abnormality.     HEAD CTA:  ANTERIOR CIRCULATION: No stenosis/occlusion, aneurysm, or high flow vascular malformation. Mild atheromatous changes in the carotid siphons. Standard [place] of Dodd anatomy.     POSTERIOR CIRCULATION: No stenosis/occlusion, aneurysm, or high flow vascular malformation. Dominant left and smaller right vertebral artery contribute to a normal basilar artery.      DURAL VENOUS SINUSES: Expected enhancement of the major dural venous sinuses.     NECK CTA:  RIGHT CAROTID: Mild atheromatous change. No measurable stenosis or dissection.     LEFT CAROTID: Mild atheromatous change. No measurable stenosis or dissection.     VERTEBRAL ARTERIES: No focal stenosis or dissection. Dominant left and smaller right vertebral arteries.     AORTIC ARCH: Classic aortic arch anatomy with no significant stenosis at the origin of the great vessels.     NONVASCULAR STRUCTURES: Unremarkable.                                                                      IMPRESSION:   HEAD CT:  1.  No CT evidence for acute intracranial process.  2.  Brain atrophy and presumed chronic microvascular ischemic changes as above.     HEAD CTA:   1.  No large vessel occlusion or flow-limiting stenosis.  2.  Mild atheromatous changes in the carotid siphons.     NECK CTA:  1.  No measurable stenosis or dissection.        Past Medical History  No past medical history on file.   Patient Active Problem List   Diagnosis    Altered mental status, unspecified altered mental status type    Community acquired  "pneumonia of left upper lobe of lung    Sepsis, due to unspecified organism, unspecified whether acute organ dysfunction present (H)         Past Surgical History  No past surgical history on file.   Social History  Greta      Family History  Greta's family history is not on file.    Home Medications  Current Outpatient Medications   Medication Instructions    amLODIPine (NORVASC) 10 MG tablet 1 tablet, Oral, DAILY    aspirin (ASA) 325 mg, Oral, 2 TIMES DAILY, 1 tab twice daily for 2 weeks then 1 tab once daily until gone    aspirin 81 mg, Oral, DAILY    atorvastatin (LIPITOR) 20 MG tablet 1 tablet, Oral, AT BEDTIME    hydrOXYzine HCl (ATARAX) 25 mg, Oral, EVERY 6 HOURS PRN    metoprolol succinate ER (TOPROL XL) 100 mg, Oral, DAILY    oxyCODONE (ROXICODONE) 5 mg, Oral, EVERY 6 HOURS PRN       Allergies  Allergies   Allergen Reactions    Penicillins Unknown     Patient's daughter states she is allergic but is not sure what the reaction is and the patient states she is not allergic.       ROS could not be obtained because patient was confused      Physical Exam:  I performed all aspects of the physical examination via Telemedicine associated with two way audio and video communication.    Vital Signs: Blood pressure (!) 143/82, pulse 117, temperature 98.1  F (36.7  C), temperature source Temporal, resp. rate 14, height 1.549 m (5' 1\"), weight 73.2 kg (161 lb 6.4 oz), SpO2 90%.    Physical Exam    Gen:  Well-developed, sleeping, in no acute distress, lying semi-supine in her hospital bed.  HEENT:  NC/AT,   Resp:  No accessory muscle use, bilateral BS are diminished but this is most likely secondary to effort  Card:  No murmur, normal S1, S2   Abd:  Soft per RN exam, no TTP, non-distended, normoactive bowel sounds are present,  Skin: No rashes or skin breakdown.   Ext: right knee/leg noted with some bruising, left leg without any edema  Psych:  Not anxious, not agitated      DATA:    I&O: No intake/output data " recorded.     Labs:  I have personally reviewed the following studies:  Recent Labs   Lab 09/29/24 2122   POTASSIUM 3.7   CHLORIDE 100   CO2 23   BUN 13.8   ALBUMIN 4.0   ALKPHOS 94   AST 18   ALT 12      Recent Labs   Lab 09/29/24 2122   WBC 7.5   HGB 12.9   HCT 36.7      TSH 3.24          Imaging: ER imaging reviewed      Misc  DVT prophylaxis: Lovenox  Code Status: Full code   Cardiac Monitoring: No active telemetry  Simms: No  Lines:   peripheral IV  Diet:   cardiac      ASSESSMENT/PLAN:   67y/o female with worsening confusion after knee surgery, tachycardia, decreased oxygen saturation and CT chest  Patchy alveolar infiltrate posterior left upper lobe compatible with pneumonia, will be admitted for pneumonia and possibly early sepsis.   I have reviewed several medical records from [facility] and there is no mention of a penicillin allergy.       This patient's current admission to an acute care setting is essential for the treatment of  pneumonia and early sepsis    The patient's recovery is dependent on the following treatments of    - Zosyn 4.5mg IV   - IVF  - supplemental oxygen support  - follow up on blood culture  -  correcting any electrolyte abnormalities  to stabilize this condition.     The patient is at risk of developing further complications of end organ failure if not treated in an acute care setting.     I expect the patient's hospital stay to require 2 - 4 days      Our patient will be admitted under the hospitalist services and further management to be based on patient's clinical progress.       #ACTIVE PROBLEM LIST:    # Hypertension:  143/82  Stable.  - Continue with daily vitals,    - Continue Amlodipine 10mg  - continue with metoprolol succinate 150mg q day    # Hyperlipidemia:                                  - Continue medication management with atorvastatin    #S/P Right Knee Replacement  - PT/OT  - Pain control with oxycodone    #Obstructive sleep apnea  - CPAP at night  - will  "monitor oxygen saturation with vital signs      # Obesity: Estimated body mass index is 30.5 kg/m  as calculated from the following:    Height as of this encounter: 1.549 m (5' 1\").    Weight as of this encounter: 73.2 kg (161 lb 6.4 oz).       Clinically Significant Risk Factors Present on Admission          As the provider for the telehealth service, I attest that I introduced myself to the patient and provided my credentials. Based on review of the patient s chart and/or a discussion with members of the patient s treatment team, I determined that telemedicine via a real-time, two-way, interactive audio and video platform is an appropriate means of providing this service. The patient and I mutually agreed that this visit is appropriate for telemedicine as well.    The encounter was approximately 15 minutes. The nurse was present for the duration of the encounter.    Chart reviewed,labs and available imaging reviewed,consultant notes reviewed. Previous available medical records have been reviewed  Care plan discussed with care team    I have seen and examined the patient today. Documentation for this visit was completed using a template. Everything documented was personally performed today with the necessary additions, deletions and changes made as appropriate with the diagnoses being listed in no particular order of clinical relevance.    Naz Daley MD, FACP  Securely message with the Vocera Web Console (learn more here)  Text page via [employer] Paging/Directory   "

## 2024-09-30 NOTE — PLAN OF CARE
Goal Outcome Evaluation:      Plan of Care Reviewed With: patient, family    Overall Patient Progress: no changeOverall Patient Progress: no change    Patient is confused to time and situation. Confusion continues to improve throughout shift. Denies pain. First half of the morning patient could barely follow one step instructions. Patient is now following instructions better and moving well with A1 and walker. Up in chair this afternoon. Voiding frequently on bedside commode. Tele reads ST. Patient is on any where from 2-4L Oxymask/NC throughout the day. Dips in the 80s when sleeping. Currently thought patient is on RA. Poor appetite today. Tylenol given for knee pain, with improvement. IV zosyn discontinued and IV rocephin and Zithromax given. IVF decreased to 50ml/hr.

## 2024-09-30 NOTE — PROGRESS NOTES
S-(situation): Patient arrives to room 218 via cart from ED.    B-(background): Altered mental status.    A-(assessment): Pt. Alert to self only, knows that she is in the hospital but not the correct one. BP elevated, family states has not had BP medications since surgery. Maintaining O2 sats on 4 LPM via NC. CMS intact, incision dressing CDI. LS clear, diminished. Denies pain, denies SOB.     R-(recommendations): Orders reviewed with patient and daughter, Claudia. Will monitor patient per MD orders.     Inpatient nursing criteria listed below were met:    Health care directives status obtained and documented: Yes  VTE ordered/documented: Yes  Skin issues/needs documented:Yes  Isolation addressed and Signage used: NA  Fall Prevention: Care plan updated Yes Education given and documented Yes  Care Plan initiated and Co-Morbidities added: Yes  Education Assessment documented:Yes  Admission Education Documented: Yes  If present CAUTI/CLABI Education done: NA  New medication patient education completed and documented (Possible Side Effects of Common Medications handout): Yes  Allergies Reviewed: Yes  Admission Medication Reconciliation completed: Yes  Home medications if not able to send immediately home with family stored here: NA  Reminder note placed in discharge instructions regarding home meds: NA  Individualized care needs/preferences addressed and charted: Yes  Provider Notified that patient has arrived to the unit: Yes

## 2024-09-30 NOTE — PROGRESS NOTES
Pt. Oxygen titrated between RA and 10 LPM via oxymask to maintain O2 sats. CPAP attempted but patient very agitated while it was on, pulling at mask and cords, gown. Pt. Able to maintain O2 on RA while awake, requires up to 10 LPM while asleep. Does have HX of MARKUS but refuses machine at home. Gave PRN atarax for agitation, pt was able to rest comfortably for some time after that. Incontinent of urine and stool.

## 2024-09-30 NOTE — ED NOTES
ED Nursing criteria listed below was addressed during verbal handoff:     Abnormal vitals: Yes  Abnormal results: Yes  Med Reconciliation completed: Yes, completed with daughter. Aware that she should be restarting her amlodipine and metoprolol.  Meds given in ED: Yes  Any Overdue Meds: No  Core Measures: N/A  Isolation: No  Special needs: Yes  Skin assessment: Yes    Observation Patient  Education provided: N/A, inpatient admission.    Patient going to , report given to Maria Isabel WOODS. Patient remains confused. Displaced purewick at time of transfer so is soiled. EDT aware, patient will be changed as they are admitted upstairs.

## 2024-09-30 NOTE — PROGRESS NOTES
[facility]    Medicine Progress Note - Hospitalist Service    Date of Admission:  9/29/2024    Brief Note, patient admitted after midnight last night. No charge visit    Assessment & Plan    Greta Watts is a 68 year old female  S/P right total knee replacement (09/25/2024) , MARKUS, hypertension, hyperlipidemia presenting to ED with altered mental status that per the daughters began the day following her knee surgery and has gotten progressively worse.  Patient was sleepy and confused throughout the examination. Information obtained from ED provider note and nurse.     Metabolic encephlopathy  Lethargy. Atrophy and small vessel changes by head ct in setting of acute illness, opioid pain regimen.  VBG done pH and co2 wnl.  - OT cog eval in am  -pt consulted    SIRS.  Possible pneumonia.    Tachycardic, mildly elevated lactate on admission.  Given crystalloid with resolution of elevated lactate.  Placed on zosyn on admission (report of possible allergy but we could not find this and she tolerated it).  Still slightly tachycardic this afternoon however she did not receive beta blocker today.  Patchy LISA infiltrate on CT.  Changed zosyn to ceftriaxone and azithromycin.  -continue azithromycin and ceftriaxone  -pulmonary hygiene    S/p RTKA.  Minimal swelling.  I don't think there is infection at incision site.  With acute illness, encephalopathy and recent rtka, she would likey benefit from TCU.    -pt/ot to assess tomorrow    Essential htn.  Continue home regimen    Untreated MARKUS.  TTE in am.  I have advised her that she needs sleep study    HLD. Continue statin          Diet: Combination Diet Low Saturated Fat Na <2400mg Diet    DVT Prophylaxis: Enoxaparin (Lovenox) SQ  Simms Catheter: Not present  Lines: None     Cardiac Monitoring: ACTIVE order. Indication: Tachyarrhythmias, acute (48 hours)  Code Status: Full Code      Clinically Significant Risk Factors Present on Admission         "  # Hypocalcemia: Lowest Ca = 8.5 mg/dL in last 2 days, will monitor and replace as appropriate       # Drug Induced Platelet Defect: home medication list includes an antiplatelet medication           # Obesity: Estimated body mass index is 30.5 kg/m  as calculated from the following:    Height as of this encounter: 1.549 m (5' 1\").    Weight as of this encounter: 73.2 kg (161 lb 6.4 oz).              Disposition Plan     Medically Ready for Discharge: Anticipated in 2-4 Days     The patient's care was discussed with the  patient, family and entire care team .    Trang Miguel CNP  Hospitalist Service  [facility]  Securely message with [employer] (more info)  Text page via [facility] Paging/Directory   ____________________________________________________________________    Interval History   Still quite lethargic on my exam this afternoon    Physical Exam   Vital Signs: Temp: 98  F (36.7  C) Temp src: Oral BP: (!) 132/97 Pulse: 113   Resp: 18 SpO2: 97 % O2 Device: Oxymask Oxygen Delivery: 4 LPM  Weight: 161 lbs 6.4 oz    Gen:  Lying in bed no acute distress  HEENT:  normocephalic, atraumatic, oropharynx clear  Resp:  CTA posteriorly, normal respiratory rate  Card:  S1,S2, RRR no murmur, rub or gallop  Abd:  Soft nondistended, non tender,  normoactive bowel sounds   Neuro:  Not oriented to place, or time, but does converse about the president etc.  No focal deficits.       Medical Decision Making       55 MINUTES SPENT BY ME on the date of service doing chart review, history, exam, documentation & further activities per the note.        Data     I have personally reviewed the following data over the past 24 hrs:    6.4  \   11.3 (L)   / 211     136 100 10.6 /  113 (H)   3.8 21 (L) 0.74 \     ALT: 12 AST: 18 AP: 94 TBILI: 1.1   ALB: 4.0 TOT PROTEIN: 6.9 LIPASE: N/A     Trop: 7 BNP: N/A     TSH: 3.24 T4: N/A A1C: N/A     Procal: 0.09 CRP: 53.02 (H) Lactic Acid: 1.4         Imaging results reviewed " over the past 24 hrs:   Recent Results (from the past 24 hour(s))   Head CT w/o contrast    Narrative    EXAM: CTA HEAD NECK W CONTRAST, CT HEAD W/O CONTRAST  LOCATION: [facility]  DATE: 9/29/2024    INDICATION: AMSX 3 days.  COMPARISON: None.  CONTRAST: 100 mL Isovue 370.  TECHNIQUE: Head and neck CT angiogram with IV contrast. Noncontrast head CT followed by axial helical CT images of the head and neck vessels obtained during the arterial phase of intravenous contrast administration. Axial 2D reconstructed images and   multiplanar 3D MIP reconstructed images of the head and neck vessels were performed by the technologist. Dose reduction techniques were used. All stenosis measurements made according to NASCET criteria unless otherwise specified.    FINDINGS:   NONCONTRAST HEAD CT:   INTRACRANIAL CONTENTS: No intracranial hemorrhage, extraaxial collection, or mass effect. No CT evidence of acute infarct. Mild presumed chronic small vessel ischemic changes. Mild generalized volume loss. No hydrocephalus.     VISUALIZED ORBITS/SINUSES/MASTOIDS: No intraorbital abnormality. No paranasal sinus mucosal disease. No middle ear or mastoid effusion.    BONES/SOFT TISSUES: No acute abnormality.    HEAD CTA:  ANTERIOR CIRCULATION: No stenosis/occlusion, aneurysm, or high flow vascular malformation. Mild atheromatous changes in the carotid siphons. Standard [place] anatomy.    POSTERIOR CIRCULATION: No stenosis/occlusion, aneurysm, or high flow vascular malformation. Dominant left and smaller right vertebral artery contribute to a normal basilar artery.     DURAL VENOUS SINUSES: Expected enhancement of the major dural venous sinuses.    NECK CTA:  RIGHT CAROTID: Mild atheromatous change. No measurable stenosis or dissection.    LEFT CAROTID: Mild atheromatous change. No measurable stenosis or dissection.    VERTEBRAL ARTERIES: No focal stenosis or dissection. Dominant left and smaller  right vertebral arteries.    AORTIC ARCH: Classic aortic arch anatomy with no significant stenosis at the origin of the great vessels.    NONVASCULAR STRUCTURES: Unremarkable.      Impression    IMPRESSION:   HEAD CT:  1.  No CT evidence for acute intracranial process.  2.  Brain atrophy and presumed chronic microvascular ischemic changes as above.    HEAD CTA:   1.  No large vessel occlusion or flow-limiting stenosis.  2.  Mild atheromatous changes in the carotid siphons.    NECK CTA:  1.  No measurable stenosis or dissection.   CTA Head Neck with Contrast    Narrative    EXAM: CTA HEAD NECK W CONTRAST, CT HEAD W/O CONTRAST  LOCATION: [facility]  DATE: 9/29/2024    INDICATION: AMSX 3 days.  COMPARISON: None.  CONTRAST: 100 mL Isovue 370.  TECHNIQUE: Head and neck CT angiogram with IV contrast. Noncontrast head CT followed by axial helical CT images of the head and neck vessels obtained during the arterial phase of intravenous contrast administration. Axial 2D reconstructed images and   multiplanar 3D MIP reconstructed images of the head and neck vessels were performed by the technologist. Dose reduction techniques were used. All stenosis measurements made according to NASCET criteria unless otherwise specified.    FINDINGS:   NONCONTRAST HEAD CT:   INTRACRANIAL CONTENTS: No intracranial hemorrhage, extraaxial collection, or mass effect. No CT evidence of acute infarct. Mild presumed chronic small vessel ischemic changes. Mild generalized volume loss. No hydrocephalus.     VISUALIZED ORBITS/SINUSES/MASTOIDS: No intraorbital abnormality. No paranasal sinus mucosal disease. No middle ear or mastoid effusion.    BONES/SOFT TISSUES: No acute abnormality.    HEAD CTA:  ANTERIOR CIRCULATION: No stenosis/occlusion, aneurysm, or high flow vascular malformation. Mild atheromatous changes in the carotid siphons. Standard [place] anatomy.    POSTERIOR CIRCULATION: No stenosis/occlusion,  aneurysm, or high flow vascular malformation. Dominant left and smaller right vertebral artery contribute to a normal basilar artery.     DURAL VENOUS SINUSES: Expected enhancement of the major dural venous sinuses.    NECK CTA:  RIGHT CAROTID: Mild atheromatous change. No measurable stenosis or dissection.    LEFT CAROTID: Mild atheromatous change. No measurable stenosis or dissection.    VERTEBRAL ARTERIES: No focal stenosis or dissection. Dominant left and smaller right vertebral arteries.    AORTIC ARCH: Classic aortic arch anatomy with no significant stenosis at the origin of the great vessels.    NONVASCULAR STRUCTURES: Unremarkable.      Impression    IMPRESSION:   HEAD CT:  1.  No CT evidence for acute intracranial process.  2.  Brain atrophy and presumed chronic microvascular ischemic changes as above.    HEAD CTA:   1.  No large vessel occlusion or flow-limiting stenosis.  2.  Mild atheromatous changes in the carotid siphons.    NECK CTA:  1.  No measurable stenosis or dissection.   CT Chest/Abdomen/Pelvis w Contrast   Result Value    Radiologist flags Pulmonary nodule    Narrative    EXAM: CT CHEST/ABDOMEN/PELVIS W CONTRAST  LOCATION: [facility]  DATE: 9/29/2024    INDICATION: Altered mental status. Total knee replacement. Strange behavior. Progressive confusion. Evaluate for source.  COMPARISON: None.  TECHNIQUE: CT scan of the chest, abdomen, and pelvis was performed following injection of IV contrast. Multiplanar reformats were obtained. Dose reduction techniques were used.   CONTRAST: 100 mL Isovue 370.    FINDINGS:   LUNGS AND PLEURA: Patchy alveolar infiltrate posterior left upper lobe compatible with left upper lobe pneumonia. 8 mm nodule subpleural aspect right lower lobe posteriorly (5/186). Minimal linear atelectasis both lung bases.    MEDIASTINUM/AXILLAE: Normal caliber thoracic aorta without dissection. Minimal aortic calcification. Right brachiocephalic, left  common carotid, and left subclavian arteries patent. Patent bilateral common carotid and vertebral arteries. No large central   pulmonary embolism. Peripheral pulmonary arteries unable to be evaluated due to phase of contrast opacification. Minimal sliding esophageal hiatal hernia. No pericardial fluid or lymphadenopathy.    CORONARY ARTERY CALCIFICATION: Mild.    HEPATOBILIARY: Normal.    PANCREAS: Normal.    SPLEEN: Normal.    ADRENAL GLANDS: Normal.    KIDNEYS/BLADDER: Symmetric renal enhancement. Symmetric contrast excretion without evidence for obstruction. Bladder unremarkable.    BOWEL: No obstruction or inflammatory change.    LYMPH NODES: No lymphadenopathy.    VASCULATURE: Normal caliber abdominal aorta without evidence for dissection. Patent celiac, SMA, bilateral renal arteries and LAURY. Moderate vascular calcification.    PELVIC ORGANS: 2.8 cm right adnexal cyst. No free fluid.    MUSCULOSKELETAL: Moderate degenerative osteoporosis left hip. Minimal degenerative changes right hip. Marked lower lumbar facet arthropathy.      Impression    IMPRESSION:  1.  Patchy alveolar infiltrate posterior left upper lobe compatible with pneumonia.    2.  Right lower lobe subpleural pulmonary nodule in the posterior costophrenic angle with mean diameter of 8 mm. Continued CT follow-up as detailed below.    3.  Normal caliber thoracic and abdominal aorta without dissection. Branch vessels remain patent.    4.  Minimal sliding esophageal hiatal hernia.    5.  No central pulmonary embolism. Peripheral pulmonary arteries unable to be evaluated due to phase of contrast opacification.    6.  2.8 cm right adnexal cyst. Management recommendations as detailed below.    REFERENCE:  Guidelines for Management of Incidental Pulmonary Nodules Detected on CT Images: From the Fleischner Society 2017.   Guidelines apply to incidental nodules in patients who are 35 years or older.  Guidelines do not apply to lung cancer screening,  patients with immunosuppression, or patients with known primary cancer.    SINGLE NODULE    Nodule size 6-8 mm  Low-risk patients: Follow-up CT at 6-12 months, then consider CT at 18-24 months.  High-risk patients: Follow-up CT at 6-12 months, then at 18-24 months if no change.    REFERENCE:  Management of Incidental Adnexal Findings on CT and MRI: A White Paper of the ACR Incidental Findings Committee. J Am Tamiko Radiol 2020; 17(2):248-254.    Postmenopausal or equal to or >50 years if status unknown:    Equal to or <3 cm: No further imaging.    [Recommend Follow Up: Pulmonary nodule]    This report will be copied to the [facility] to ensure a provider acknowledges the finding.

## 2024-09-30 NOTE — ED PROVIDER NOTES
Transfer of Care Note  This patient was signed out to me and I assumed care from Dr. Templeton at change of shift.  Greta Watts is a 68 year old female who presented to the emergency department with a chief complaint of Altered Mental Status  .  Please see the original providers history and physical for complete details.    The following issues were signed out to me to follow up on:  Labs, ct and disposition      Pertant Lab/Imaging Findings During this Visit was     Results for orders placed or performed during the hospital encounter of 09/29/24 (from the past 24 hour(s))   CBC with platelets differential    Narrative    The following orders were created for panel order CBC with platelets differential.  Procedure                               Abnormality         Status                     ---------                               -----------         ------                     CBC with platelets and d...[[id number]]                      Final result                 Please view results for these tests on the individual orders.   Comprehensive metabolic panel   Result Value Ref Range    Sodium 138 135 - 145 mmol/L    Potassium 3.7 3.4 - 5.3 mmol/L    Carbon Dioxide (CO2) 23 22 - 29 mmol/L    Anion Gap 15 7 - 15 mmol/L    Urea Nitrogen 13.8 8.0 - 23.0 mg/dL    Creatinine 0.85 0.51 - 0.95 mg/dL    GFR Estimate 73 >60 mL/min/1.73m2    Calcium 9.1 8.8 - 10.4 mg/dL    Chloride 100 98 - 107 mmol/L    Glucose 155 (H) 70 - 99 mg/dL    Alkaline Phosphatase 94 40 - 150 U/L    AST 18 0 - 45 U/L    ALT 12 0 - 50 U/L    Protein Total 6.9 6.4 - 8.3 g/dL    Albumin 4.0 3.5 - 5.2 g/dL    Bilirubin Total 1.1 <=1.2 mg/dL   Troponin T, High Sensitivity   Result Value Ref Range    Troponin T, High Sensitivity 8 <=14 ng/L   [facility] Draw    Narrative    The following orders were created for panel order [facility] Draw.  Procedure                               Abnormality         Status                     ---------                                -----------         ------                     Extra Blue Top Tube[[id number]]                              Final result                 Please view results for these tests on the individual orders.   CBC with platelets and differential   Result Value Ref Range    WBC Count 7.5 4.0 - 11.0 10e3/uL    RBC Count 4.07 3.80 - 5.20 10e6/uL    Hemoglobin 12.9 11.7 - 15.7 g/dL    Hematocrit 36.7 35.0 - 47.0 %    MCV 90 78 - 100 fL    MCH 31.7 26.5 - 33.0 pg    MCHC 35.1 31.5 - 36.5 g/dL    RDW 12.7 10.0 - 15.0 %    Platelet Count 261 150 - 450 10e3/uL    % Neutrophils 71 %    % Lymphocytes 18 %    % Monocytes 7 %    % Eosinophils 3 %    % Basophils 1 %    % Immature Granulocytes 1 %    NRBCs per 100 WBC 0 <1 /100    Absolute Neutrophils 5.3 1.6 - 8.3 10e3/uL    Absolute Lymphocytes 1.4 0.8 - 5.3 10e3/uL    Absolute Monocytes 0.5 0.0 - 1.3 10e3/uL    Absolute Eosinophils 0.2 0.0 - 0.7 10e3/uL    Absolute Basophils 0.0 0.0 - 0.2 10e3/uL    Absolute Immature Granulocytes 0.0 <=0.4 10e3/uL    Absolute NRBCs 0.0 10e3/uL   Extra Blue Top Tube   Result Value Ref Range    Hold Specimen     TSH with free T4 reflex   Result Value Ref Range    TSH 3.24 0.30 - 4.20 uIU/mL   Magnesium   Result Value Ref Range    Magnesium 1.7 1.7 - 2.3 mg/dL   Erythrocyte sedimentation rate auto   Result Value Ref Range    Erythrocyte Sedimentation Rate 42 (H) 0 - 30 mm/hr   CRP inflammation   Result Value Ref Range    CRP Inflammation 53.02 (H) <5.00 mg/L   UA with Microscopic reflex to Culture    Specimen: Urine, Catheter   Result Value Ref Range    Color Urine Yellow Colorless, Straw, Light Yellow, Yellow    Appearance Urine Clear Clear    Glucose Urine Negative Negative mg/dL    Bilirubin Urine Negative Negative    Ketones Urine 5 (A) Negative mg/dL    Specific Gravity Urine 1.014 1.003 - 1.035    Blood Urine Moderate (A) Negative    pH Urine 7.0 5.0 - 7.0    Protein Albumin Urine Negative Negative mg/dL    Urobilinogen Urine 2.0 Normal, 2.0  mg/dL    Nitrite Urine Negative Negative    Leukocyte Esterase Urine Negative Negative    Mucus Urine Present (A) None Seen /LPF    RBC Urine 7 (H) <=2 /HPF    WBC Urine 1 <=5 /HPF    Hyaline Casts Urine 3 (H) <=2 /LPF    Narrative    Urine Culture not indicated   Lactic acid whole blood with 1x repeat in 2 hr when >2   Result Value Ref Range    Lactic Acid, Initial 2.7 (H) 0.7 - 2.0 mmol/L   Head CT w/o contrast    Narrative    EXAM: CTA HEAD NECK W CONTRAST, CT HEAD W/O CONTRAST  LOCATION: [facility]  DATE: 9/29/2024    INDICATION: AMSX 3 days.  COMPARISON: None.  CONTRAST: 100 mL Isovue 370.  TECHNIQUE: Head and neck CT angiogram with IV contrast. Noncontrast head CT followed by axial helical CT images of the head and neck vessels obtained during the arterial phase of intravenous contrast administration. Axial 2D reconstructed images and   multiplanar 3D MIP reconstructed images of the head and neck vessels were performed by the technologist. Dose reduction techniques were used. All stenosis measurements made according to NASCET criteria unless otherwise specified.    FINDINGS:   NONCONTRAST HEAD CT:   INTRACRANIAL CONTENTS: No intracranial hemorrhage, extraaxial collection, or mass effect. No CT evidence of acute infarct. Mild presumed chronic small vessel ischemic changes. Mild generalized volume loss. No hydrocephalus.     VISUALIZED ORBITS/SINUSES/MASTOIDS: No intraorbital abnormality. No paranasal sinus mucosal disease. No middle ear or mastoid effusion.    BONES/SOFT TISSUES: No acute abnormality.    HEAD CTA:  ANTERIOR CIRCULATION: No stenosis/occlusion, aneurysm, or high flow vascular malformation. Mild atheromatous changes in the carotid siphons. Standard [place] anatomy.    POSTERIOR CIRCULATION: No stenosis/occlusion, aneurysm, or high flow vascular malformation. Dominant left and smaller right vertebral artery contribute to a normal basilar artery.     DURAL  VENOUS SINUSES: Expected enhancement of the major dural venous sinuses.    NECK CTA:  RIGHT CAROTID: Mild atheromatous change. No measurable stenosis or dissection.    LEFT CAROTID: Mild atheromatous change. No measurable stenosis or dissection.    VERTEBRAL ARTERIES: No focal stenosis or dissection. Dominant left and smaller right vertebral arteries.    AORTIC ARCH: Classic aortic arch anatomy with no significant stenosis at the origin of the great vessels.    NONVASCULAR STRUCTURES: Unremarkable.      Impression    IMPRESSION:   HEAD CT:  1.  No CT evidence for acute intracranial process.  2.  Brain atrophy and presumed chronic microvascular ischemic changes as above.    HEAD CTA:   1.  No large vessel occlusion or flow-limiting stenosis.  2.  Mild atheromatous changes in the carotid siphons.    NECK CTA:  1.  No measurable stenosis or dissection.   CTA Head Neck with Contrast    Narrative    EXAM: CTA HEAD NECK W CONTRAST, CT HEAD W/O CONTRAST  LOCATION: [facility]  DATE: 9/29/2024    INDICATION: AMSX 3 days.  COMPARISON: None.  CONTRAST: 100 mL Isovue 370.  TECHNIQUE: Head and neck CT angiogram with IV contrast. Noncontrast head CT followed by axial helical CT images of the head and neck vessels obtained during the arterial phase of intravenous contrast administration. Axial 2D reconstructed images and   multiplanar 3D MIP reconstructed images of the head and neck vessels were performed by the technologist. Dose reduction techniques were used. All stenosis measurements made according to NASCET criteria unless otherwise specified.    FINDINGS:   NONCONTRAST HEAD CT:   INTRACRANIAL CONTENTS: No intracranial hemorrhage, extraaxial collection, or mass effect. No CT evidence of acute infarct. Mild presumed chronic small vessel ischemic changes. Mild generalized volume loss. No hydrocephalus.     VISUALIZED ORBITS/SINUSES/MASTOIDS: No intraorbital abnormality. No paranasal sinus mucosal  disease. No middle ear or mastoid effusion.    BONES/SOFT TISSUES: No acute abnormality.    HEAD CTA:  ANTERIOR CIRCULATION: No stenosis/occlusion, aneurysm, or high flow vascular malformation. Mild atheromatous changes in the carotid siphons. Standard [place] anatomy.    POSTERIOR CIRCULATION: No stenosis/occlusion, aneurysm, or high flow vascular malformation. Dominant left and smaller right vertebral artery contribute to a normal basilar artery.     DURAL VENOUS SINUSES: Expected enhancement of the major dural venous sinuses.    NECK CTA:  RIGHT CAROTID: Mild atheromatous change. No measurable stenosis or dissection.    LEFT CAROTID: Mild atheromatous change. No measurable stenosis or dissection.    VERTEBRAL ARTERIES: No focal stenosis or dissection. Dominant left and smaller right vertebral arteries.    AORTIC ARCH: Classic aortic arch anatomy with no significant stenosis at the origin of the great vessels.    NONVASCULAR STRUCTURES: Unremarkable.      Impression    IMPRESSION:   HEAD CT:  1.  No CT evidence for acute intracranial process.  2.  Brain atrophy and presumed chronic microvascular ischemic changes as above.    HEAD CTA:   1.  No large vessel occlusion or flow-limiting stenosis.  2.  Mild atheromatous changes in the carotid siphons.    NECK CTA:  1.  No measurable stenosis or dissection.   CT Chest/Abdomen/Pelvis w Contrast   Result Value Ref Range    Radiologist flags Pulmonary nodule     Narrative    EXAM: CT CHEST/ABDOMEN/PELVIS W CONTRAST  LOCATION: [facility]  DATE: 9/29/2024    INDICATION: Altered mental status. Total knee replacement. Strange behavior. Progressive confusion. Evaluate for source.  COMPARISON: None.  TECHNIQUE: CT scan of the chest, abdomen, and pelvis was performed following injection of IV contrast. Multiplanar reformats were obtained. Dose reduction techniques were used.   CONTRAST: 100 mL Isovue 370.    FINDINGS:   LUNGS AND PLEURA:  Patchy alveolar infiltrate posterior left upper lobe compatible with left upper lobe pneumonia. 8 mm nodule subpleural aspect right lower lobe posteriorly (5/186). Minimal linear atelectasis both lung bases.    MEDIASTINUM/AXILLAE: Normal caliber thoracic aorta without dissection. Minimal aortic calcification. Right brachiocephalic, left common carotid, and left subclavian arteries patent. Patent bilateral common carotid and vertebral arteries. No large central   pulmonary embolism. Peripheral pulmonary arteries unable to be evaluated due to phase of contrast opacification. Minimal sliding esophageal hiatal hernia. No pericardial fluid or lymphadenopathy.    CORONARY ARTERY CALCIFICATION: Mild.    HEPATOBILIARY: Normal.    PANCREAS: Normal.    SPLEEN: Normal.    ADRENAL GLANDS: Normal.    KIDNEYS/BLADDER: Symmetric renal enhancement. Symmetric contrast excretion without evidence for obstruction. Bladder unremarkable.    BOWEL: No obstruction or inflammatory change.    LYMPH NODES: No lymphadenopathy.    VASCULATURE: Normal caliber abdominal aorta without evidence for dissection. Patent celiac, SMA, bilateral renal arteries and LAURY. Moderate vascular calcification.    PELVIC ORGANS: 2.8 cm right adnexal cyst. No free fluid.    MUSCULOSKELETAL: Moderate degenerative osteoporosis left hip. Minimal degenerative changes right hip. Marked lower lumbar facet arthropathy.      Impression    IMPRESSION:  1.  Patchy alveolar infiltrate posterior left upper lobe compatible with pneumonia.    2.  Right lower lobe subpleural pulmonary nodule in the posterior costophrenic angle with mean diameter of 8 mm. Continued CT follow-up as detailed below.    3.  Normal caliber thoracic and abdominal aorta without dissection. Branch vessels remain patent.    4.  Minimal sliding esophageal hiatal hernia.    5.  No central pulmonary embolism. Peripheral pulmonary arteries unable to be evaluated due to phase of contrast opacification.    6.   2.8 cm right adnexal cyst. Management recommendations as detailed below.    REFERENCE:  Guidelines for Management of Incidental Pulmonary Nodules Detected on CT Images: From the Fleischner Society 2017.   Guidelines apply to incidental nodules in patients who are 35 years or older.  Guidelines do not apply to lung cancer screening, patients with immunosuppression, or patients with known primary cancer.    SINGLE NODULE    Nodule size 6-8 mm  Low-risk patients: Follow-up CT at 6-12 months, then consider CT at 18-24 months.  High-risk patients: Follow-up CT at 6-12 months, then at 18-24 months if no change.    REFERENCE:  Management of Incidental Adnexal Findings on CT and MRI: A White Paper of the ACR Incidental Findings Committee. J Am Tamiko Radiol 2020; 17(2):248-254.    Postmenopausal or equal to or >50 years if status unknown:    Equal to or <3 cm: No further imaging.    [Recommend Follow Up: Pulmonary nodule]    This report will be copied to the [facility] to ensure a provider acknowledges the finding.             My focused follow up physical exam shows:   Vitals:  B/P: 143/82, T: 98.1, P: 117, R: 14  Gen:  Pt appears stable and no apparent distress      ED Course & Medical Decision Making:  Patient's lactic did come back elevated, I think that the altered mental status is likely from the sepsis.  CT scan of the chest abdomen pelvis did show findings of a community-acquired pneumonia which is likely the source.  CT scan of the head and vessels were normal though.  Patient was given a dose of Zosyn.  Initially it was noted the patient had no known drug allergies but another daughter arrives and states that she does have a penicillin allergy.  Patient had no reaction to the Zosyn infusion, will continue to monitor.  I discussed the case with our hospitalist, Dr. Daley who will accept the patient.  Patient's heart rate has improved with the fluids that he has received here thus far.  Patient will be  transferred to the floor at this time.         Impression and Disposition:  Sepsis, community-acquired pneumonia, altered mental status           This note was completed in part using Dragon voice recognition, and may contain word and grammatical errors.        Dwaine Orozco MD  09/30/24 0014

## 2024-09-30 NOTE — ED TRIAGE NOTES
"Patient had a total knee replacement to [facility] on Wednesday. Discharged Thursday. For a few days after, patient started exhibiting strange behavior. Has been on oxycodone after surgery and progressively more confused. Daughter states patient has been \"off\" for 2-3 days now. No focal deficits noted in triage. Has also not had her BP meds as was not instructed when to restart them post-op.        "

## 2024-10-01 ENCOUNTER — APPOINTMENT (OUTPATIENT)
Dept: CARDIOLOGY | Facility: CLINIC | Age: 69
DRG: 193 | End: 2024-10-01
Attending: NURSE PRACTITIONER
Payer: COMMERCIAL

## 2024-10-01 ENCOUNTER — APPOINTMENT (OUTPATIENT)
Dept: PHYSICAL THERAPY | Facility: CLINIC | Age: 69
DRG: 193 | End: 2024-10-01
Attending: INTERNAL MEDICINE
Payer: COMMERCIAL

## 2024-10-01 ENCOUNTER — APPOINTMENT (OUTPATIENT)
Dept: OCCUPATIONAL THERAPY | Facility: CLINIC | Age: 69
DRG: 193 | End: 2024-10-01
Attending: INTERNAL MEDICINE
Payer: COMMERCIAL

## 2024-10-01 LAB
ALBUMIN SERPL BCG-MCNC: 3.4 G/DL (ref 3.5–5.2)
ALP SERPL-CCNC: 72 U/L (ref 40–150)
ALT SERPL W P-5'-P-CCNC: 7 U/L (ref 0–50)
ANION GAP SERPL CALCULATED.3IONS-SCNC: 13 MMOL/L (ref 7–15)
AST SERPL W P-5'-P-CCNC: 16 U/L (ref 0–45)
BILIRUB SERPL-MCNC: 1 MG/DL
BUN SERPL-MCNC: 10.9 MG/DL (ref 8–23)
CALCIUM SERPL-MCNC: 8.6 MG/DL (ref 8.8–10.4)
CHLORIDE SERPL-SCNC: 102 MMOL/L (ref 98–107)
CREAT SERPL-MCNC: 0.74 MG/DL (ref 0.51–0.95)
CRP SERPL-MCNC: 50.32 MG/L
EGFRCR SERPLBLD CKD-EPI 2021: 87 ML/MIN/1.73M2
ERYTHROCYTE [DISTWIDTH] IN BLOOD BY AUTOMATED COUNT: 12.9 % (ref 10–15)
FOLATE SERPL-MCNC: 5.9 NG/ML (ref 4.6–34.8)
GLUCOSE SERPL-MCNC: 99 MG/DL (ref 70–99)
HCO3 SERPL-SCNC: 21 MMOL/L (ref 22–29)
HCT VFR BLD AUTO: 31.9 % (ref 35–47)
HGB BLD-MCNC: 11.1 G/DL (ref 11.7–15.7)
LVEF ECHO: NORMAL
MCH RBC QN AUTO: 32.1 PG (ref 26.5–33)
MCHC RBC AUTO-ENTMCNC: 34.8 G/DL (ref 31.5–36.5)
MCV RBC AUTO: 92 FL (ref 78–100)
PLATELET # BLD AUTO: 233 10E3/UL (ref 150–450)
POTASSIUM SERPL-SCNC: 3.7 MMOL/L (ref 3.4–5.3)
PROT SERPL-MCNC: 6 G/DL (ref 6.4–8.3)
RBC # BLD AUTO: 3.46 10E6/UL (ref 3.8–5.2)
SODIUM SERPL-SCNC: 136 MMOL/L (ref 135–145)
VIT B12 SERPL-MCNC: 272 PG/ML (ref 232–1245)
WBC # BLD AUTO: 6.6 10E3/UL (ref 4–11)

## 2024-10-01 PROCEDURE — 86140 C-REACTIVE PROTEIN: CPT | Performed by: NURSE PRACTITIONER

## 2024-10-01 PROCEDURE — 250N000013 HC RX MED GY IP 250 OP 250 PS 637: Performed by: INTERNAL MEDICINE

## 2024-10-01 PROCEDURE — 36415 COLL VENOUS BLD VENIPUNCTURE: CPT | Performed by: NURSE PRACTITIONER

## 2024-10-01 PROCEDURE — 999N000123 HC STATISTIC OXYGEN O2DAILY TECH TIME

## 2024-10-01 PROCEDURE — 258N000003 HC RX IP 258 OP 636: Performed by: NURSE PRACTITIONER

## 2024-10-01 PROCEDURE — 93306 TTE W/DOPPLER COMPLETE: CPT

## 2024-10-01 PROCEDURE — 97162 PT EVAL MOD COMPLEX 30 MIN: CPT | Mod: GP | Performed by: PHYSICAL THERAPIST

## 2024-10-01 PROCEDURE — 97165 OT EVAL LOW COMPLEX 30 MIN: CPT | Mod: GO

## 2024-10-01 PROCEDURE — 120N000001 HC R&B MED SURG/OB

## 2024-10-01 PROCEDURE — 82607 VITAMIN B-12: CPT | Performed by: NURSE PRACTITIONER

## 2024-10-01 PROCEDURE — 250N000013 HC RX MED GY IP 250 OP 250 PS 637: Performed by: NURSE PRACTITIONER

## 2024-10-01 PROCEDURE — 250N000013 HC RX MED GY IP 250 OP 250 PS 637: Performed by: FAMILY MEDICINE

## 2024-10-01 PROCEDURE — 82746 ASSAY OF FOLIC ACID SERUM: CPT | Performed by: NURSE PRACTITIONER

## 2024-10-01 PROCEDURE — 93306 TTE W/DOPPLER COMPLETE: CPT | Mod: 26 | Performed by: INTERNAL MEDICINE

## 2024-10-01 PROCEDURE — 80053 COMPREHEN METABOLIC PANEL: CPT | Performed by: NURSE PRACTITIONER

## 2024-10-01 PROCEDURE — 250N000011 HC RX IP 250 OP 636: Performed by: NURSE PRACTITIONER

## 2024-10-01 PROCEDURE — 250N000011 HC RX IP 250 OP 636: Performed by: INTERNAL MEDICINE

## 2024-10-01 PROCEDURE — 99232 SBSQ HOSP IP/OBS MODERATE 35: CPT | Performed by: NURSE PRACTITIONER

## 2024-10-01 PROCEDURE — 97530 THERAPEUTIC ACTIVITIES: CPT | Mod: GP | Performed by: PHYSICAL THERAPIST

## 2024-10-01 PROCEDURE — 97110 THERAPEUTIC EXERCISES: CPT | Mod: GP | Performed by: PHYSICAL THERAPIST

## 2024-10-01 PROCEDURE — 85027 COMPLETE CBC AUTOMATED: CPT | Performed by: NURSE PRACTITIONER

## 2024-10-01 RX ORDER — ACETAMINOPHEN 325 MG/1
975 TABLET ORAL EVERY 8 HOURS
Status: DISCONTINUED | OUTPATIENT
Start: 2024-10-01 | End: 2024-10-03 | Stop reason: HOSPADM

## 2024-10-01 RX ORDER — IBUPROFEN 600 MG/1
1 TABLET, FILM COATED ORAL
Status: ON HOLD | COMMUNITY
Start: 2024-09-26 | End: 2024-10-03

## 2024-10-01 RX ADMIN — AZITHROMYCIN MONOHYDRATE 500 MG: 500 INJECTION, POWDER, LYOPHILIZED, FOR SOLUTION INTRAVENOUS at 17:54

## 2024-10-01 RX ADMIN — ASPIRIN 81 MG: 81 TABLET, COATED ORAL at 09:08

## 2024-10-01 RX ADMIN — ENOXAPARIN SODIUM 40 MG: 40 INJECTION SUBCUTANEOUS at 09:14

## 2024-10-01 RX ADMIN — ACETAMINOPHEN 975 MG: 325 TABLET ORAL at 19:36

## 2024-10-01 RX ADMIN — ACETAMINOPHEN 650 MG: 325 TABLET ORAL at 08:03

## 2024-10-01 RX ADMIN — ACETAMINOPHEN 975 MG: 325 TABLET ORAL at 13:11

## 2024-10-01 RX ADMIN — AMLODIPINE BESYLATE 10 MG: 5 TABLET ORAL at 09:08

## 2024-10-01 RX ADMIN — METOPROLOL SUCCINATE 150 MG: 50 TABLET, EXTENDED RELEASE ORAL at 09:09

## 2024-10-01 RX ADMIN — ATORVASTATIN CALCIUM 20 MG: 10 TABLET, FILM COATED ORAL at 21:06

## 2024-10-01 RX ADMIN — SODIUM CHLORIDE: 9 INJECTION, SOLUTION INTRAVENOUS at 16:29

## 2024-10-01 RX ADMIN — CEFTRIAXONE SODIUM 2 G: 2 INJECTION, POWDER, FOR SOLUTION INTRAMUSCULAR; INTRAVENOUS at 16:30

## 2024-10-01 ASSESSMENT — ACTIVITIES OF DAILY LIVING (ADL)
ADLS_ACUITY_SCORE: 41
ADLS_ACUITY_SCORE: 41
ADLS_ACUITY_SCORE: 39
ADLS_ACUITY_SCORE: 41
ADLS_ACUITY_SCORE: 39
ADLS_ACUITY_SCORE: 39
ADLS_ACUITY_SCORE: 41
ADLS_ACUITY_SCORE: 41
ADLS_ACUITY_SCORE: 39
ADLS_ACUITY_SCORE: 41
DEPENDENT_IADLS:: INDEPENDENT
ADLS_ACUITY_SCORE: 41
ADLS_ACUITY_SCORE: 41
ADLS_ACUITY_SCORE: 39
ADLS_ACUITY_SCORE: 41
ADLS_ACUITY_SCORE: 41
ADLS_ACUITY_SCORE: 39
ADLS_ACUITY_SCORE: 39
ADLS_ACUITY_SCORE: 41
ADLS_ACUITY_SCORE: 39
PREVIOUS_RESPONSIBILITIES: HOUSEKEEPING;MEAL PREP;LAUNDRY;SHOPPING;MEDICATION MANAGEMENT;FINANCES;DRIVING
ADLS_ACUITY_SCORE: 41
ADLS_ACUITY_SCORE: 39

## 2024-10-01 NOTE — PROGRESS NOTES
10/01/24 1106   Appointment Info   Signing Clinician's Name / Credentials (OT) Douglas Sepulveda OTR/L   Living Environment   People in Home alone   Current Living Arrangements house   Home Accessibility no concerns   Transportation Anticipated family or friend will provide   Living Environment Comments Normal bed, walk in shower. Daughter reports after surgery/hospital stay, patient will be staying with daughter with 24/7 support. Daughter reports her mother's cognition has improved greatly since the other day but is still not at baseline.   Self-Care   Usual Activity Tolerance good   Current Activity Tolerance fair   Regular Exercise No   Equipment Currently Used at Home walker, rolling;cane, straight;wheelchair, power  (uses walker and cane but has the power wheelchair.)   Instrumental Activities of Daily Living (IADL)   Previous Responsibilities housekeeping;meal prep;laundry;shopping;medication management;finances;driving   IADL Comments IND in all IADLs prior   General Information   Onset of Illness/Injury or Date of Surgery 09/30/24   Referring Physician Trang Miguel CNP   Patient/Family Therapy Goal Statement (OT) to go home   Additional Occupational Profile Info/Pertinent History of Current Problem Patient is a 68 year old female  S/P right total knee replacement (09/25/2024) , MARKUS, hypertension, hyperlipidemia presenting to ED with altered mental status that per the daughters began the day following her knee surgery and has gotten progressively worse.  Patient was sleepy and confused throughout the examination. Information obtained from ED provider note and nurse.   Left Upper Extremity (Weight-bearing Status) full weight-bearing (FWB)   Right Upper Extremity (Weight-bearing Status) full weight-bearing (FWB)   Left Lower Extremity (Weight-bearing Status) full weight-bearing (FWB)   Right Lower Extremity (Weight-bearing Status) weight-bearing as tolerated (WBAT)   Cognitive Status Examination   Orientation  Status orientation to person, place and time   Affect/Mental Status (Cognitive) WFL   Follows Commands WFL   Cognitive Screens/Assessments   Cognitive Assessments Completed [facility] Mental Status Exam (UMS):  Total Score out of /30 21/30   SLUMS Norms 21-26 equals mild neurocognitive disorder   SLUMS Domains assessed: orientation, memory, attention, executive functions   SLUMS Interpretation Patient scored 21/30 on SLUMS which falls in the mild neurocognitive disorder category. Daughter reports that patient's cognition has improved  since the other day but is still not at baseline.   Visual Perception   Visual Impairment/Limitations WFL   Sensory   Sensory Quick Adds sensation intact   Pain Assessment   Patient Currently in Pain No   Posture   Posture not impaired   Range of Motion Comprehensive   General Range of Motion no range of motion deficits identified   Strength Comprehensive (MMT)   General Manual Muscle Testing (MMT) Assessment no strength deficits identified   Muscle Tone Assessment   Muscle Tone Quick Adds No deficits were identified   Coordination   Upper Extremity Coordination No deficits were identified   Bed Mobility   Bed Mobility supine-sit;sit-supine   Supine-Sit Heard (Bed Mobility) modified independence   Sit-Supine Heard (Bed Mobility) modified independence   Assistive Device (Bed Mobility) bed rails   Comment (Bed Mobility) Patient MOD IND with bed mobility with the use of bed rails.\   Transfers   Transfers sit-stand transfer;toilet transfer   Sit-Stand Transfer   Sit-Stand Heard (Transfers) supervision   Assistive Device (Sit-Stand Transfers) walker, front-wheeled   Sit/Stand Transfer Comments Patient supervision with sit to stand transfer from EOB to FWW.   Toilet Transfer   Type (Toilet Transfer) sit-stand;stand-sit   [place] Level (Toilet Transfer) supervision   Assistive Device (Toilet Transfer) walker, front-wheeled;raised toilet seat    Toilet Transfer Comments Patient supervision with toilet transfers with the use of FWW and raised toilet seat.   Balance   Balance Assessment no deficits identified   Activities of Daily Living   BADL Assessment/Intervention lower body dressing;grooming;toileting   Lower Body Dressing Assessment/Training   Position (Lower Body Dressing) edge of bed sitting   Comment, (Lower Body Dressing) Patient able to independently don/doff socks while seated EOB.   [place] Level (Lower Body Dressing) doff;don;socks;independent   Grooming Assessment/Training   Position (Grooming) sink side   [place] Level (Grooming) wash face, hands;supervision   Comment, (Grooming) Patient supervision with grooming task while standing at sink level with FWW.   Toileting   Assistive Devices (Toileting) raised toilet seat   Comment, (Toileting) Patient supervision with toilet transfers and toileting simulation.   [place] Level (Toileting) toileting skills;supervision   Clinical Impression   Criteria for Skilled Therapeutic Interventions Met (OT) Evaluation only   ADL comments/analysis Patient PLOF IND with ADLs. Currently, patient supervision/mod IND with ADLs.   Clinical Decision Making Complexity (OT) problem focused assessment/low complexity   Risk & Benefits of therapy have been explained evaluation/treatment results reviewed;care plan/treatment goals reviewed;participants voiced agreement with care plan;participants included;patient;daughter   Clinical Impression Comments From OT perspective patient PLOF IND with ADLs. Patient currently supervision/mod IND with ADLs. Please refer to PT notes for patient's current level of functional mobility.   OT Total Evaluation Time   OT Eval, Low Complexity Minutes (02291) 35   OT Discharge Planning   OT Plan No further IP OT identified   OT Discharge Recommendation (DC Rec) Transitional Care Facility;home with assist   OT Rationale for DC Rec Patient from home alone. From OT perspective  patient PLOF IND with ADLs. Patient currently supervision/mod IND with ADLs. ADLs currently being most impacted by mobility, which is being addressed by PT. Due to below baseline cogntion, which is falling within the range of mild neurocognitve disorders, 24/7 supervision is recommended. If this cannot be provided by patient's family at this time, TCU is recommended until family identifies increased ability to care for patient within the home.   OT Brief overview of current status Patient PLOF IND with ADLs. Currently, patient supervision/mod IND with ADLs. SLUMS of 21/30 - Mild Neurocognitive disorder. No further inpatient OT needs at this time.   Total Session Time   Total Session Time (sum of timed and untimed services) 35     Douglas Sepulveda OTR/L  Occupational Therapist

## 2024-10-01 NOTE — PLAN OF CARE
Goal Outcome Evaluation:      Plan of Care Reviewed With: patient, child          Outcome Evaluation: Spoke with patient and daughter Claudia at bedside, plan is to go to TCU upon discharge

## 2024-10-01 NOTE — PROGRESS NOTES
[facility]    Medicine Progress Note - Hospitalist Service    Date of Admission:  9/29/2024    Assessment & Plan   Greta Watts is a 68 year old female  S/P right total knee replacement (09/25/2024) , MARKUS, hypertension, hyperlipidemia presenting to ED with altered mental status that per the daughters began the day following her knee surgery and has gotten progressively worse.  Patient was sleepy and confused throughout the examination. Information obtained from ED provider note and nurse.     Metabolic encephlopathy  Lethargy.   Atrophy and small vessel changes by head ct in setting of acute illness, opioid pain regimen.  VBG done pH and co2 wnl.  This morning patient seems improved, more conversant, alert and oriented.  Per daughter patient continues to not to be at baseline yet.  - OT cog eval pending  -pt consulted and is pending  -Limit opioids if able, currently will have a low dose of oxycodone available as needed  -Will stop hydroxyzine as this also can be anticholinergic and contribute to delirium  -Treat underlying infection per below  -Vitamin B12 and folate levels pending    SIRS.  Possible pneumonia.      Tachycardic, mildly elevated lactate on admission.  Given crystalloid with resolution of elevated lactate.  Placed on zosyn on admission (report of possible allergy but we could not find this and she tolerated it).  9/30 Still slightly tachycardic this afternoon however she did not receive beta blocker .  Patchy LISA infiltrate on CT.  9/30 Changed zosyn to ceftriaxone and azithromycin.  Overnight patient has remained afebrile and is on room air.  Heart rate has improved, this morning is 95.  -continue azithromycin and ceftriaxone  -pulmonary hygiene    S/p RTKA.  Minimal swelling.  Does not appear to have infection at incision site.  With acute illness, encephalopathy and recent rtka, she would likey benefit from TCU.    -pt/ot assessments pending    Essential htn.   "Continue home regimen    Untreated MARKUS.   I have advised her that she needs sleep study.  Encouraged to use CPAP while hospitalized  -TTE pending    HLD. Continue statin    Pulmonary nodule  And 8 mm nodule subpleural right lower lobe posteriorly noted on CT scan  -Recommend follow-up CT scan in 18 months per guidelines        Diet: Combination Diet Low Saturated Fat Na <2400mg Diet    DVT Prophylaxis: Enoxaparin (Lovenox) SQ  Simms Catheter: Not present  Lines: None     Cardiac Monitoring: ACTIVE order. Indication: Tachyarrhythmias, acute (48 hours)  Code Status: Full Code      Clinically Significant Risk Factors          # Hypocalcemia: Lowest Ca = 8.5 mg/dL in last 2 days, will monitor and replace as appropriate     # Hypoalbuminemia: Lowest albumin = 3.4 g/dL at 10/1/2024  5:50 AM, will monitor as appropriate               # Obesity: Estimated body mass index is 30.5 kg/m  as calculated from the following:    Height as of this encounter: 1.549 m (5' 1\").    Weight as of this encounter: 73.2 kg (161 lb 6.4 oz)., PRESENT ON ADMISSION            Disposition Plan     Medically Ready for Discharge: Anticipated in 2-4 Days           The patient's care was discussed with the Patient and Patient's Family.    Pham Tineo CNP  Hospitalist Service  [facility]  Securely message with [employer] (more info)  Text page via [employer] Paging/Directory   ______________________________________________________________________    Interval History   No acute events overnight, patient appears more alert this morning although per daughter not back at baseline.    Physical Exam   Vital Signs: Temp: 98.7  F (37.1  C) Temp src: Oral BP: 125/76 Pulse: 95   Resp: 16 SpO2: 93 % O2 Device: None (Room air) Oxygen Delivery: 1 LPM  Weight: 161 lbs 6.4 oz    General Appearance: Sitting up in bed, no acute distress  Respiratory: Respiratory effort easy at rest, lung sounds clear.  Oxygen per room air  Cardiovascular: Regular " rate and rhythm, no murmur noted  GI: Abdomen soft and nontender with active bowel sounds  Skin: Dressing intact on the right knee.  Bruising noted right knee and right leg, resolving  Neuro: Oriented to self, daughter, place, time.  No focal deficits noted    Medical Decision Making       40 MINUTES SPENT BY ME on the date of service doing chart review, history, exam, documentation & further activities per the note.      Data     I have personally reviewed the following data over the past 24 hrs:    6.6  \   11.1 (L)   / 233     136 102 10.9 /  99   3.7 21 (L) 0.74 \     ALT: 7 AST: 16 AP: 72 TBILI: 1.0   ALB: 3.4 (L) TOT PROTEIN: 6.0 (L) LIPASE: N/A     Procal: N/A CRP: 50.32 (H) Lactic Acid: N/A         Imaging results reviewed over the past 24 hrs:   No results found for this or any previous visit (from the past 24 hour(s)).

## 2024-10-01 NOTE — CONSULTS
Care Management Initial Consult    General Information  Assessment completed with: Patient, Children, Patient, daughter Claudia  Type of CM/SW Visit: Initial Assessment    Primary Care Provider verified and updated as needed: Yes   Readmission within the last 30 days: no previous admission in last 30 days      Reason for Consult: discharge planning  Advance Care Planning: Advance Care Planning Reviewed: no concerns identified          Communication Assessment  Patient's communication style: spoken language (English or Bilingual)    Hearing Difficulty or Deaf: no   Wear Glasses or Blind: yes    Cognitive  Cognitive/Neuro/Behavioral: .WDL except  Level of Consciousness: alert  Arousal Level: arouses to voice  Orientation: disoriented to, time  Mood/Behavior: calm  Best Language: 0 - No aphasia  Speech: clear, logical    Living Environment:   People in home: alone     Current living Arrangements: house      Able to return to prior arrangements: no (Per therapy recomending TCU)       Family/Social Support:  Care provided by: self, child(nhan)  Provides care for: no one  Marital Status: Single  Support system: Children          Description of Support System: Supportive, Involved         Current Resources:   Patient receiving home care services: No        Community Resources: None  Equipment currently used at home: walker, rolling, cane, straight, wheelchair, power  Supplies currently used at home: None    Employment/Financial:  Employment Status: retired        Financial Concerns:             Does the patient's insurance plan have a 3 day qualifying hospital stay waiver?  No    Lifestyle & Psychosocial Needs:  Social Determinants of Health     Food Insecurity: Low Risk  (9/30/2024)    Food Insecurity     Within the past 12 months, did you worry that your food would run out before you got money to buy more?: No     Within the past 12 months, did the food you bought just not last and you didn t have money to get more?: No    Depression: Not at risk (9/14/2023)    Received from [facility]    PHQ-2     PHQ-2 TOTAL SCORE: 0   Housing Stability: Low Risk  (9/30/2024)    Housing Stability     Do you have housing? : Yes     Are you worried about losing your housing?: No   Tobacco Use: Low Risk  (9/9/2024)    [facility]    Patient History     Smoking Tobacco Use: Never     Smokeless Tobacco Use: Never     Passive Exposure: Not on file   Financial Resource Strain: Low Risk  (9/30/2024)    Financial Resource Strain     Within the past 12 months, have you or your family members you live with been unable to get utilities (heat, electricity) when it was really needed?: No   Alcohol Use: Not on file   Transportation Needs: Low Risk  (9/30/2024)    Transportation Needs     Within the past 12 months, has lack of transportation kept you from medical appointments, getting your medicines, non-medical meetings or appointments, work, or from getting things that you need?: No   Physical Activity: Not on file   Interpersonal Safety: Unknown (9/30/2024)    Interpersonal Safety     Do you feel physically and emotionally safe where you currently live?: Patient unable to answer     Within the past 12 months, have you been hit, slapped, kicked or otherwise physically hurt by someone?: Patient unable to answer     Within the past 12 months, have you been humiliated or emotionally abused in other ways by your partner or ex-partner?: Patient unable to answer   Stress: Not on file   Social Connections: Unknown (10/11/2023)    Received from [facility]    Social Connections     Frequency of Communication with Friends and Family: Not on file   Health Literacy: Not on file       Functional Status:  Prior to admission patient needed assistance:   Dependent ADLs:: Independent  Dependent IADLs:: Independent  Assesssment of Functional Status: Not at   functional baseline, Needs placement in a SNF/TCF for rehabilitation      Values/Beliefs:  Spiritual, Cultural Beliefs, Jehovah's witness Practices, Values that affect care: no               Discussed  Partnership in Safe Discharge Planning  document with patient/family: No    Additional Information:  Per NP at IDT rounds pt is not medically stable for discharge today. CM consulted for discharge planning. Per therapy notes recommending TCU. Patient had Total knee replacement to [facility] on Wednesday.     CM met bedside with patient and daughter Claudia for initial assessment. Per patient and Claudia at baseline pt is very independent, patient prior to surgery was walking independently, now has walker at home since surgery. CM discussed discharge plan and therapy recommendations of TCU post discharge. Patient and daughter in agreement with this plan, pt/family of limited TCU facilities due to insurance plan. Daughter given list, first choice is [facility] and second choice is [facility]. Daughter to provide transport upon discharge.    UPDATE: Pt has been accepted at [facility] (Phone: [phone] Admissions: [phone] Fax: [phone]) for TCU cares. CM placed call to Andrea in admissions for an update as this is pts first choice. Per Andrea has not had a chance to review patient for TCU, will update CM in the morning with decision. Andrea aware [facility] is first choice. Per Andrea the TCU has covid cases, pt needs to be ok with this if accepted.    Next Steps:   -Cm sent referrals for TCUs  -CM to update patient/family regarding TCU acceptances once [facility] makes a decision    Plan: TCU-referrals pending    Transport: Family to provide    RAYMUNDO Pryor  Care Transitions Registered Nurse  Tele: [phone]

## 2024-10-01 NOTE — PLAN OF CARE
Goal Outcome Evaluation:      Plan of Care Reviewed With: patient, family    Overall Patient Progress: improvingOverall Patient Progress: improving     Patient is intermittently confused, clearing more throughout the day. Much more alert and talkative. Up with A1 with walker. Worked with PT/OT. On room air most of the day except for 1L NC with sleep. Up in chair for meals. IV Rocephin and Zithromax given. IV NS running at 50ml/hr. Tylenol scheduled now for left knee pain.

## 2024-10-01 NOTE — PROGRESS NOTES
10/01/24 1106   Appointment Info   Signing Clinician's Name / Credentials (OT) Douglas Sepulveda OTR/L   Living Environment   People in Home alone   Current Living Arrangements house   Home Accessibility no concerns   Transportation Anticipated family or friend will provide   Living Environment Comments Normal bed, walk in shower. Daughter reports after surgery/hospital stay, patient will be staying with daughter with 24/7 support. Daughter reports her mother's cognition has improved greatly since the other day but is still not at baseline.   Self-Care   Usual Activity Tolerance good   Current Activity Tolerance fair   Regular Exercise No   Equipment Currently Used at Home walker, rolling;cane, straight;wheelchair, power  (uses walker and cane but has the power wheelchair.)   Instrumental Activities of Daily Living (IADL)   Previous Responsibilities housekeeping;meal prep;laundry;shopping;medication management;finances;driving   IADL Comments IND in all IADLs prior   General Information   Onset of Illness/Injury or Date of Surgery 09/30/24   Referring Physician Trang Miguel CNP   Patient/Family Therapy Goal Statement (OT) to go home   Additional Occupational Profile Info/Pertinent History of Current Problem Patient is a 68 year old female  S/P right total knee replacement (09/25/2024) , MARKUS, hypertension, hyperlipidemia presenting to ED with altered mental status that per the daughters began the day following her knee surgery and has gotten progressively worse.  Patient was sleepy and confused throughout the examination. Information obtained from ED provider note and nurse.   Left Upper Extremity (Weight-bearing Status) full weight-bearing (FWB)   Right Upper Extremity (Weight-bearing Status) full weight-bearing (FWB)   Left Lower Extremity (Weight-bearing Status) full weight-bearing (FWB)   Right Lower Extremity (Weight-bearing Status) weight-bearing as tolerated (WBAT)   Cognitive Status Examination   Orientation  Status orientation to person, place and time   Affect/Mental Status (Cognitive) WFL   Follows Commands WFL   Cognitive Screens/Assessments   Cognitive Assessments Completed [facility] Mental Status Exam (UMS):  Total Score out of /30 21/30   SLUMS Norms 21-26 equals mild neurocognitive disorder   SLUMS Domains assessed: orientation, memory, attention, executive functions   SLUMS Interpretation Patient scored 21/30 on SLUMS which falls in the mild neurocognitive disorder category. Daughter reports that patient's cognition has improved  since the other day but is still not at baseline.   Visual Perception   Visual Impairment/Limitations WFL   Sensory   Sensory Quick Adds sensation intact   Pain Assessment   Patient Currently in Pain No   Posture   Posture not impaired   Range of Motion Comprehensive   General Range of Motion no range of motion deficits identified   Strength Comprehensive (MMT)   General Manual Muscle Testing (MMT) Assessment no strength deficits identified   Muscle Tone Assessment   Muscle Tone Quick Adds No deficits were identified   Coordination   Upper Extremity Coordination No deficits were identified   Bed Mobility   Bed Mobility supine-sit;sit-supine   Supine-Sit [place] (Bed Mobility) modified independence   Sit-Supine [place] (Bed Mobility) modified independence   Assistive Device (Bed Mobility) bed rails   Comment (Bed Mobility) Patient MOD IND with bed mobility with the use of bed rails.\   Transfers   Transfers sit-stand transfer;toilet transfer   Sit-Stand Transfer   Sit-Stand [place] (Transfers) supervision   Assistive Device (Sit-Stand Transfers) walker, front-wheeled   Sit/Stand Transfer Comments Patient supervision with sit to stand transfer from EOB to FWW.   Toilet Transfer   Type (Toilet Transfer) sit-stand;stand-sit   [place] Level (Toilet Transfer) supervision   Assistive Device (Toilet Transfer) walker, front-wheeled;raised toilet seat    Toilet Transfer Comments Patient supervision with toilet transfers with the use of FWW and raised toilet seat.   Balance   Balance Assessment no deficits identified   Activities of Daily Living   BADL Assessment/Intervention lower body dressing;grooming;toileting   Lower Body Dressing Assessment/Training   Position (Lower Body Dressing) edge of bed sitting   Comment, (Lower Body Dressing) Patient able to independently don/doff socks while seated EOB.   [place] Level (Lower Body Dressing) doff;don;socks;independent   Grooming Assessment/Training   Position (Grooming) sink side   [place] Level (Grooming) wash face, hands;supervision   Comment, (Grooming) Patient supervision with grooming task while standing at sink level with FWW.   Toileting   Assistive Devices (Toileting) raised toilet seat   Comment, (Toileting) Patient supervision with toilet transfers and toileting simulation.   [place] Level (Toileting) toileting skills;supervision   Clinical Impression   Criteria for Skilled Therapeutic Interventions Met (OT) Evaluation only   ADL comments/analysis Patient PLOF IND with ADLs. Currently, patient supervision/mod IND with ADLs.   Clinical Decision Making Complexity (OT) problem focused assessment/low complexity   Risk & Benefits of therapy have been explained evaluation/treatment results reviewed;care plan/treatment goals reviewed;participants voiced agreement with care plan;participants included;patient;daughter   Clinical Impression Comments From OT perspective patient PLOF IND with ADLs. Patient currently supervision/mod IND with ADLs. Please refer to PT notes for patient's current level of functional mobility.   OT Total Evaluation Time   OT Eval, Low Complexity Minutes (94657) 35   OT Discharge Planning   OT Plan No further IP OT identified   OT Discharge Recommendation (DC Rec) home with assist   OT Rationale for DC Rec From OT perspective patient PLOF IND with ADLs. Patient currently  supervision/mod IND with ADLs. Patient from home alone but planning to discharge with daughter who can provide 24/7 support. Please refer to PT notes for patient's current level of functional mobility.   OT Brief overview of current status Patient PLOF IND with ADLs. Currently, patient supervision/mod IND with ADLs.   Total Session Time   Total Session Time (sum of timed and untimed services) 35     Douglas Sepulveda OTR/L  Occupational Therapist

## 2024-10-01 NOTE — MEDICATION SCRIBE - ADMISSION MEDICATION HISTORY
Medication Scribe Admission Medication History    Admission medication history is complete. The information provided in this note is only as accurate as the sources available at the time of the update.    Information Source(s): Patient via in-person    Pertinent Information: Patient reports she is not sure why but she has not taken any of her medications the last 3-4 days. Patient is also not able to verify if she is taking aspirin 325 mg or 81 mg but she is taking an aspirin.     Changes made to PTA medication list:  Added: Ibuprofen 600 mg from reconcile list; patient confirms, supported by dispense history   Deleted: Polyethylene glycol & Sennosides 8.6 mg - per patient, not taking   Changed: Metoprolol 100 mg changed to taking 1.5 tablet (150 mg) from 1 tablet (100 mg) as confirmed by patient, supported by dispense hx     Allergies reviewed with patient and updates made in EHR: yes    Medication History Completed By: KIM STEVENS 10/1/2024 1:47 PM    PTA Med List   Medication Sig Last Dose    amLODIPine (NORVASC) 10 MG tablet Take 1 tablet by mouth daily. Past Week at am    aspirin (ASA) 325 MG tablet Take 325 mg by mouth 2 times daily. 1 tab twice daily for 2 weeks then 1 tab once daily until gone Unknown at unkonwn    aspirin 81 MG EC tablet Take 81 mg by mouth daily. Unknown at unknown    atorvastatin (LIPITOR) 20 MG tablet Take 1 tablet by mouth at bedtime. Past Week at hs    hydrOXYzine HCl (ATARAX) 25 MG tablet Take 25 mg by mouth every 6 hours as needed. Past Week at unknown    ibuprofen (ADVIL/MOTRIN) 600 MG tablet Take 1 tablet by mouth 3 times daily. Past Week at unkown    metoprolol succinate ER (TOPROL XL) 100 MG 24 hr tablet Take 150 mg by mouth daily. Take 1.5 tablets (150 mg) daily Past Week at 0800    oxyCODONE (ROXICODONE) 5 MG tablet Take 5 mg by mouth every 6 hours as needed. Past Week at unknown

## 2024-10-01 NOTE — PROGRESS NOTES
10/01/24 0930   Appointment Info   Signing Clinician's Name / Credentials (PT) Maria Isabel Car PT, DPT, ATC, LAT   Rehab Comments (PT) PT eval and treat in with acute confusion, difficuty walking, recent rtka Activity order: IS, up ad cherry,       Present no   Living Environment   People in Home alone   Current Living Arrangements house   Home Accessibility no concerns   Transportation Anticipated family or friend will provide   Living Environment Comments flat bed. walk in shower. following TKA patient staying with her daughter with 24/7 support. daughter expresses significant concerns regarding patient's progressive decline in mentation following surgery and in ability to manage her needs safely at this time   Self-Care   Usual Activity Tolerance good   Current Activity Tolerance fair   Regular Exercise No   Equipment Currently Used at Home walker, rolling   Fall history within last six months no   General Information   Onset of Illness/Injury or Date of Surgery 09/29/24   Referring Physician Trang Miguel CNP   Patient/Family Therapy Goals Statement (PT) TCU placement   Pertinent History of Current Problem (include personal factors and/or comorbidities that impact the POC) Patient is a 68 year old female, presenting with alterne dmental status following R TKA 9/25/24. Patient found to have metabolic encephalopathy, lethargy, possible pneumonia. Patient with a previous medical history of MARKUS, HTN, HLD.   Existing Precautions/Restrictions fall   Weight-Bearing Status - LUE full weight-bearing   Weight-Bearing Status - RUE full weight-bearing   Weight-Bearing Status - LLE weight-bearing as tolerated   Weight-Bearing Status - RLE full weight-bearing   General Observations patient on room air. RUE IV infusing. daughter at bedside   Cognition   Affect/Mental Status (Cognition) WFL  (cheerful and grateful)   Orientation Status (Cognition) oriented x 4   Follows Commands (Cognition) WFL   Pain  Assessment   Patient Currently in Pain Yes, see Vital Sign flowsheet   Integumentary/Edema   Integumentary/Edema Comments RLE post op edema and ecchymosis consistent with recent TKA, dressing CDI to surgical site   Posture    Posture Not impaired   Range of Motion (ROM)   ROM Comment lacking terminal knee extension, right knee flexion to 50* AROM and 65* PROM   Strength (Manual Muscle Testing)   Strength (Manual Muscle Testing) Able to perform L SLR   Strength Comments weak right quad set, knee flexed in all weight bearing and unable to create stability in stance phase on the RLE   Bed Mobility   Bed Mobility sit-supine   Sit-Supine Richardson (Bed Mobility) independent   Transfers   Transfers sit-stand transfer;bed-chair transfer;toilet transfer   Maintains Weight-bearing Status (Transfers) able to maintain   Transfer Safety Concerns Noted losing balance backward;decreased step length   Impairments Contributing to Impaired Transfers decreased strength;decreased ROM;pain   Bed-Chair Transfer   Assistive Device (Bed-Chair Transfers) walker, front-wheeled   Bed-Chair Richardson (Transfers) contact guard;verbal cues   Sit-Stand Transfer   Sit-Stand Richardson (Transfers) contact guard;verbal cues   Assistive Device (Sit-Stand Transfers) walker, front-wheeled   Toilet Transfer   Richardson Level (Toilet Transfer) contact guard;verbal cues   Assistive Device (Toilet Transfer) walker, front-wheeled   Type (Toilet Transfer) stand-sit;sit-stand   Gait/Stairs (Locomotion)   Richardson Level (Gait) contact guard;verbal cues   Assistive Device (Gait) walker, front-wheeled   Distance in Feet (Gait) 20   Pattern (Gait) step-to;3-point   Deviations/Abnormal Patterns (Gait) stride length decreased;gait speed decreased;base of support, narrow;antalgic  (right knee flexed throughout stance)   Maintains Weight-bearing Status (Gait) able to maintain   Balance   Balance Comments IND short sitting balance. standing balance  with walker and right knee flexed presenting an increased risk of falls due to weakness   Sensory Examination   Sensory Perception patient reports no sensory changes   Coordination   Coordination no deficits were identified   Muscle Tone   Muscle Tone no deficits were identified   Clinical Impression   Criteria for Skilled Therapeutic Intervention Yes, treatment indicated   PT Diagnosis (PT) impaired mobility, impaired balance, joint stiffness, fatigue   Influenced by the following impairments post op status, acute infection   Functional limitations due to impairments assistance for cares, assistance for mobility and safety, decreased activity tolerance, increased risk of falls due to weakness and poor insight   Clinical Presentation (PT Evaluation Complexity) evolving   Clinical Presentation Rationale clinical judgement, medical status, less 1 week status post R TKA   Clinical Decision Making (Complexity) moderate complexity   Planned Therapy Interventions (PT) balance training;cryotherapy;gait training;home exercise program;neuromuscular re-education;stair training;ROM (range of motion);strengthening;patient/family education;transfer training;progressive activity/exercise;home program guidelines   Risk & Benefits of therapy have been explained evaluation/treatment results reviewed;current/potential barriers reviewed;participants voiced agreement with care plan;participants included;patient;daughter   Clinical Impression Comments Currently patient is well below her functional baseline with acute infection, impaired mentation and limited mobility tolerance. Patient would benefit from TCU placement in order to progress towards greater safety, independence and mobility to decrease care giver burden.   PT Total Evaluation Time   PT Marycarmen Moderate Complexity Minutes (70274) 15   Physical Therapy Goals   PT Frequency 5x/week   PT Predicted Duration/Target Date for Goal Attainment 10/08/24   PT Goals Bed  Mobility;Transfers;Gait;Stairs   PT: Bed Mobility Independent;Supine to/from sit   PT: Transfers Modified independent;Sit to/from stand;Assistive device;Within precautions   PT: Gait Modified independent;Rolling walker;150 feet;Within precautions   PT: Stairs Supervision/stand-by assist;2 stairs;Rail on both sides;Within precautions   PT Discharge Planning   PT Plan 1/5 Tues. ambulate, exercise, knee extension and quad set focus   PT Discharge Recommendation (DC Rec) Transitional Care Facility;home with assist;home with home care physical therapy   PT Rationale for DC Rec Patient from home alone at baseline, IND in mobility and self management. Following TKA patient discharged to her daughter's home for post op recovery assistance. Currently patient is well below her functional baseline with acute infection, impaired mentation and limited mobility tolerance. Patient would benefit from TCU placement in order to progress towards greater safety, independence and mobility to decrease care giver burden. Patient has the potential to return home with family support, however family expressing significant concerns with this due to patient's decline following surgery. Given the development of pneumonia following surgery TCU placement would be most advantageous in preventing the deleterious effects of decreased mobility.   PT Brief overview of current status IND sit to supine. SBA sit to/from stand with walker. CGA to SBA 50' with walker. HEP with total cues and physical assistance.   Total Session Time   Total Session Time (sum of timed and untimed services) 15     Thank you for your referral.  Maria Isabel Car, PT, DPT, ATC, LAT    [facility]  O: [phone]  E: [email or web address]

## 2024-10-01 NOTE — PLAN OF CARE
"Goal Outcome Evaluation:      Plan of Care Reviewed With: patient, family    Overall Patient Progress: no changeOverall Patient Progress: no change    Outcome Evaluation: Pt remained confused overnight but easily redirectable.  1 L NC.  NS running at 50 ml/hr.  Tele shows NSR rate of 80-90.  Knee dressing CDI    BP (!) 145/94 (BP Location: Left arm)   Pulse 86   Temp 98.5  F (36.9  C) (Oral)   Resp 18   Ht 1.549 m (5' 1\")   Wt 73.2 kg (161 lb 6.4 oz)   SpO2 96%   BMI 30.50 kg/m     "

## 2024-10-02 ENCOUNTER — APPOINTMENT (OUTPATIENT)
Dept: PHYSICAL THERAPY | Facility: CLINIC | Age: 69
DRG: 193 | End: 2024-10-02
Payer: COMMERCIAL

## 2024-10-02 LAB
ALBUMIN SERPL BCG-MCNC: 3.4 G/DL (ref 3.5–5.2)
ALP SERPL-CCNC: 72 U/L (ref 40–150)
ALT SERPL W P-5'-P-CCNC: 7 U/L (ref 0–50)
ANION GAP SERPL CALCULATED.3IONS-SCNC: 12 MMOL/L (ref 7–15)
AST SERPL W P-5'-P-CCNC: 14 U/L (ref 0–45)
BILIRUB SERPL-MCNC: 0.9 MG/DL
BUN SERPL-MCNC: 12.7 MG/DL (ref 8–23)
CALCIUM SERPL-MCNC: 8.7 MG/DL (ref 8.8–10.4)
CHLORIDE SERPL-SCNC: 105 MMOL/L (ref 98–107)
CREAT SERPL-MCNC: 0.7 MG/DL (ref 0.51–0.95)
CRP SERPL-MCNC: 52.86 MG/L
EGFRCR SERPLBLD CKD-EPI 2021: >90 ML/MIN/1.73M2
ERYTHROCYTE [DISTWIDTH] IN BLOOD BY AUTOMATED COUNT: 12.8 % (ref 10–15)
GLUCOSE SERPL-MCNC: 108 MG/DL (ref 70–99)
HCO3 SERPL-SCNC: 21 MMOL/L (ref 22–29)
HCT VFR BLD AUTO: 29.5 % (ref 35–47)
HGB BLD-MCNC: 10.2 G/DL (ref 11.7–15.7)
HGB BLD-MCNC: 10.2 G/DL (ref 11.7–15.7)
MCH RBC QN AUTO: 31.6 PG (ref 26.5–33)
MCHC RBC AUTO-ENTMCNC: 34.6 G/DL (ref 31.5–36.5)
MCV RBC AUTO: 91 FL (ref 78–100)
PLATELET # BLD AUTO: 248 10E3/UL (ref 150–450)
POTASSIUM SERPL-SCNC: 3.5 MMOL/L (ref 3.4–5.3)
PROT SERPL-MCNC: 6.2 G/DL (ref 6.4–8.3)
RBC # BLD AUTO: 3.23 10E6/UL (ref 3.8–5.2)
SODIUM SERPL-SCNC: 138 MMOL/L (ref 135–145)
WBC # BLD AUTO: 7.3 10E3/UL (ref 4–11)

## 2024-10-02 PROCEDURE — 36415 COLL VENOUS BLD VENIPUNCTURE: CPT | Performed by: NURSE PRACTITIONER

## 2024-10-02 PROCEDURE — 99232 SBSQ HOSP IP/OBS MODERATE 35: CPT | Performed by: NURSE PRACTITIONER

## 2024-10-02 PROCEDURE — 250N000013 HC RX MED GY IP 250 OP 250 PS 637: Performed by: NURSE PRACTITIONER

## 2024-10-02 PROCEDURE — 250N000013 HC RX MED GY IP 250 OP 250 PS 637: Performed by: INTERNAL MEDICINE

## 2024-10-02 PROCEDURE — 97110 THERAPEUTIC EXERCISES: CPT | Mod: GP | Performed by: PHYSICAL THERAPIST

## 2024-10-02 PROCEDURE — 250N000011 HC RX IP 250 OP 636: Performed by: INTERNAL MEDICINE

## 2024-10-02 PROCEDURE — 85014 HEMATOCRIT: CPT | Performed by: NURSE PRACTITIONER

## 2024-10-02 PROCEDURE — 86140 C-REACTIVE PROTEIN: CPT | Performed by: NURSE PRACTITIONER

## 2024-10-02 PROCEDURE — 80053 COMPREHEN METABOLIC PANEL: CPT | Performed by: NURSE PRACTITIONER

## 2024-10-02 PROCEDURE — 97530 THERAPEUTIC ACTIVITIES: CPT | Mod: GP | Performed by: PHYSICAL THERAPIST

## 2024-10-02 PROCEDURE — 120N000001 HC R&B MED SURG/OB

## 2024-10-02 PROCEDURE — 85018 HEMOGLOBIN: CPT | Performed by: NURSE PRACTITIONER

## 2024-10-02 RX ORDER — ASPIRIN 325 MG
325 TABLET, DELAYED RELEASE (ENTERIC COATED) ORAL 2 TIMES DAILY
Status: DISCONTINUED | OUTPATIENT
Start: 2024-10-03 | End: 2024-10-03 | Stop reason: HOSPADM

## 2024-10-02 RX ORDER — CEFDINIR 300 MG/1
300 CAPSULE ORAL EVERY 12 HOURS SCHEDULED
Status: DISCONTINUED | OUTPATIENT
Start: 2024-10-02 | End: 2024-10-03 | Stop reason: HOSPADM

## 2024-10-02 RX ORDER — AZITHROMYCIN 250 MG/1
500 TABLET, FILM COATED ORAL ONCE
Status: COMPLETED | OUTPATIENT
Start: 2024-10-02 | End: 2024-10-02

## 2024-10-02 RX ADMIN — AZITHROMYCIN DIHYDRATE 500 MG: 250 TABLET, FILM COATED ORAL at 08:04

## 2024-10-02 RX ADMIN — ASPIRIN 81 MG: 81 TABLET, COATED ORAL at 10:21

## 2024-10-02 RX ADMIN — ACETAMINOPHEN 975 MG: 325 TABLET ORAL at 03:22

## 2024-10-02 RX ADMIN — ENOXAPARIN SODIUM 40 MG: 40 INJECTION SUBCUTANEOUS at 10:22

## 2024-10-02 RX ADMIN — ACETAMINOPHEN 975 MG: 325 TABLET ORAL at 12:45

## 2024-10-02 RX ADMIN — ACETAMINOPHEN 975 MG: 325 TABLET ORAL at 19:53

## 2024-10-02 RX ADMIN — CEFDINIR 300 MG: 300 CAPSULE ORAL at 20:02

## 2024-10-02 RX ADMIN — CEFDINIR 300 MG: 300 CAPSULE ORAL at 10:21

## 2024-10-02 RX ADMIN — AMLODIPINE BESYLATE 10 MG: 5 TABLET ORAL at 10:21

## 2024-10-02 RX ADMIN — ATORVASTATIN CALCIUM 20 MG: 10 TABLET, FILM COATED ORAL at 22:11

## 2024-10-02 RX ADMIN — Medication 1 MG: at 22:13

## 2024-10-02 RX ADMIN — METOPROLOL SUCCINATE 150 MG: 50 TABLET, EXTENDED RELEASE ORAL at 10:21

## 2024-10-02 ASSESSMENT — ACTIVITIES OF DAILY LIVING (ADL)
ADLS_ACUITY_SCORE: 37
ADLS_ACUITY_SCORE: 37
ADLS_ACUITY_SCORE: 41
ADLS_ACUITY_SCORE: 37
ADLS_ACUITY_SCORE: 41
ADLS_ACUITY_SCORE: 37
ADLS_ACUITY_SCORE: 37
ADLS_ACUITY_SCORE: 41
ADLS_ACUITY_SCORE: 37
ADLS_ACUITY_SCORE: 41
ADLS_ACUITY_SCORE: 41
ADLS_ACUITY_SCORE: 37
ADLS_ACUITY_SCORE: 41
ADLS_ACUITY_SCORE: 36

## 2024-10-02 NOTE — PROGRESS NOTES
[facility]    Medicine Progress Note - Hospitalist Service    Date of Admission:  9/29/2024    Assessment & Plan   Greta Watts is a 68 year old female  S/P right total knee replacement (09/25/2024) , MARKUS, hypertension, hyperlipidemia presenting to ED with altered mental status that per the daughters began the day following her knee surgery and has gotten progressively worse.  Patient was sleepy and confused throughout the examination. Information obtained from ED provider note and nurse.     Metabolic encephlopathy  Lethargy.   Atrophy and small vessel changes by head ct in setting of acute illness, opioid pain regimen.  VBG done pH and co2 wnl.  Vitamin B12 and folate levels within normal limits.  10/1 Occupational Therapy assessed patient and did a SLUMS -she scored a 21 out of 30 indicating mild neurocognitive impairment.  Recommending discharge home with assist versus TCU.  Over the last 24 to 48 hours patient's cognitive abilities have continued to improve, alert oriented to person place time and situation.  She has not needed any further oxycodone for pain.  --Limit opioids if able, currently will have a low dose of oxycodone available as needed  -Treat underlying infection per below      SIRS.  Possible pneumonia.      Tachycardic, mildly elevated lactate on admission.  Given crystalloid with resolution of elevated lactate.  Placed on zosyn on admission (report of possible allergy but we could not find this and she tolerated it).  9/30 Still slightly tachycardic this afternoon however she did not receive beta blocker .  Patchy LISA infiltrate on CT.  9/30 Changed zosyn to ceftriaxone and azithromycin.  Patient has remained afebrile for 24 hours, has not needed any supplemental oxygen.  Denies any respiratory difficulties.  This morning changed to oral azithromycin and cefdinir.  Today was her last dose of azithromycin  -Continue oral cefdinir for a total of 5 days of  "treatment.  -pulmonary hygiene    Anemia  On admission hemoglobin 11.2.  Follow-up hemoglobin this morning was 10.2.  Patient does not appear to have any signs or symptoms of active bleeding.  Repeat hemoglobin 4 hours later was stable.  -Monitor for signs and symptoms of bleeding  -Recheck hemoglobin in the morning    S/p RTKA.  Minimal swelling.  Does not appear to have infection at incision site.    PT has assessed patient and is recommending discharging home with assist versus TCU.  TCU referrals are pending as is prior authorization.  Per pharmacy review post surgery patient was to be taking aspirin 325 mg twice daily for 2 weeks.  -Will stop Lovenox and aspirin 81 mg  -Will start aspirin 325 mg twice daily for 1 more week  -Discharge planning assistance with care coordination, TCU versus home with home care and assistance    Essential htn.  Blood pressures have been stable continue home regimen    Untreated MARKUS.   I have advised her that she needs sleep study.  Encouraged to use CPAP while hospitalized  -Recommend outpatient sleep study    HLD. Continue statin    Pulmonary nodule  And 8 mm nodule subpleural right lower lobe posteriorly noted on CT scan  -Recommend follow-up CT scan in 18 months per guidelines          Diet: Combination Diet Low Saturated Fat Na <2400mg Diet    DVT Prophylaxis: Pneumatic Compression Devices and aspirin 325 mg twice daily as recommended post surgery  Simms Catheter: Not present  Lines: None     Cardiac Monitoring: None  Code Status: Full Code      Clinically Significant Risk Factors          # Hypocalcemia: Lowest Ca = 8.6 mg/dL in last 2 days, will monitor and replace as appropriate     # Hypoalbuminemia: Lowest albumin = 3.4 g/dL at 10/2/2024  6:08 AM, will monitor as appropriate               # Obesity: Estimated body mass index is 30.5 kg/m  as calculated from the following:    Height as of this encounter: 1.549 m (5' 1\").    Weight as of this encounter: 73.2 kg (161 lb 6.4 " oz)., PRESENT ON ADMISSION            Disposition Plan     Medically Ready for Discharge: Ready Now           The patient's care was discussed with the Care Coordinator/ and Patient.  Dtr    Pham Tineo, CNP  Hospitalist Service  [facility]  Securely message with [employer] (more info)  Text page via [employer] Paging/Directory   ______________________________________________________________________    Interval History   No acute events overnight.  Patient reports she slept well.  Continues to ambulate with standby assist and walker.  Taking Tylenol for pain.  Has not needed as needed oxycodone.  Mentally feeling more clear    Physical Exam   Vital Signs: Temp: 98.9  F (37.2  C) Temp src: Oral BP: 130/72 Pulse: 86   Resp: 16 SpO2: 91 % O2 Device: None (Room air) Oxygen Delivery: 1 LPM  Weight: 161 lbs 6.4 oz    General Appearance:  Sitting up in bed, no acute distress  Respiratory: Respiratory effort easy at rest, lung sounds clear.  Oxygen per room air  Cardiovascular: Regular rate and rhythm, no murmur noted  GI: Abdomen soft and nontender with active bowel sounds  Skin: Dressing intact on the right knee.  Bruising noted right knee and right leg, resolving  Neuro: Oriented to self, daughter, place, time.  No focal deficits noted    Medical Decision Making       40 MINUTES SPENT BY ME on the date of service doing chart review, history, exam, documentation & further activities per the note.      Data     I have personally reviewed the following data over the past 24 hrs:    7.3  \   10.2 (L)   / 248     138 105 12.7 /  108 (H)   3.5 21 (L) 0.70 \     ALT: 7 AST: 14 AP: 72 TBILI: 0.9   ALB: 3.4 (L) TOT PROTEIN: 6.2 (L) LIPASE: N/A     Procal: N/A CRP: 52.86 (H) Lactic Acid: N/A         Imaging results reviewed over the past 24 hrs:   No results found for this or any previous visit (from the past 24 hour(s)).

## 2024-10-02 NOTE — PROGRESS NOTES
Care Management Follow Up    Length of Stay (days): 2    Expected Discharge Date: 10/02/24     Concerns to be Addressed: discharge planning       Patient plan of care discussed at interdisciplinary rounds: Yes    Anticipated Discharge Disposition: Transitional Care  Disposition Comments: TCU           Anticipated Discharge Services: None  Anticipated Discharge DME: None    Patient/family educated on Medicare website which has current facility and service quality ratings: yes  Education Provided on the Discharge Plan: Yes  Patient/Family in Agreement with the Plan: yes    Referrals Placed by CM/SW: External Care Coordination, Post Acute Facilities    Private pay costs discussed: transportation costs    Discussed  Partnership in Safe Discharge Planning  document with patient/family: No     Handoff Completed: No     Additional Information:  Per provider, patient is medically ready for discharge today.     Writer spoke with Andrea in admissions at [facility].  He assessed and has sent patient's assessment information to patient's [employer] insurance for prior authorization.      Writer met with patient and daughter, Claudia.  Discussed the process.  Patient's first choice is [facility], but also discussed that bed is also available at [facility] (but they have not sent for prior auth yet.)  Daughter asking latest time to admit to TCU today, if prior authorization is approval is received. Writer will talk with Andrea at [facility] regarding this.      Daughter stating she can transport patient today or tomorrow morning.    Discussed that if prior authorization is denied, then patient may have an option to appeal the decision, is she chooses.  Will need to obtain more information regarding that.      Next Steps: Await prior authorization result from the [employer] insurance.      6296- Writer has spoken with Andrea in admissions at [facility].  He stated that the prior  "authorization is still pending with this patient's insurance.  Now it is too late for a TCU admission today. Andrea reported that he will     Writer met with patient and then spoke with daughter, Claudia, over the phone. Discussed that [employer] called the hospital care management department, stating that they will pass the request for prior auth onto their medical director for review, but it did not look promising that patient would be approved.  Person from [facility] stated that documentation states patient is walking 200 feet, able do 2 stairs with support, is independent with bed mobility and has no skilled medical need.  Their recommendation was to have conversation with patient about home care.  Daughter, Claudia, stated that patient is not independent with bed mobility.     Writer discussed option of home care with patient, as she stated that if denied by insurance for TCU coverage, she will \"go home to my daughter, Claudia's\". Claudia lives in [place] at address: [address] [place] [id number]. Writer provided list of Medicare certified home care agencies and reported will make a referral to the home care hub to secure an agency.  Patient in agreement with that, but stated she plans to talk with her daughter about whether she will decide on having home care or attend the outpatient PT that had already set up for her near her home after she had the surgery.      Plan:  Await final word regarding insurance prior authorization.  Continue to work with patient on discharge plan.      Karina Valladares HOLLY  [facility]   [phone]      "

## 2024-10-02 NOTE — PLAN OF CARE
Goal Outcome Evaluation:      Plan of Care Reviewed With: patient    Overall Patient Progress: improvingOverall Patient Progress: improving    Outcome Evaluation: 4 hour shift note. Pt is A&Ox4. VSS on RA. Pt is up with SBA, gb and walker. Pt is steady. Pt has ambulated in hallways and been up to chair for meals. Bruising noted to right leg. Dressing is CDI. Pt is taking scheduled tylenol along with ice and positioning for pain managment. Pt reports this is helpful to bring pain down to or below tolerable pain level. CMS is intact-pt denies numbness. Lungs are clear throughout. Pt denies nausea and is tolerating diet.

## 2024-10-02 NOTE — PLAN OF CARE
"Goal Outcome Evaluation:      Plan of Care Reviewed With: patient    Overall Patient Progress: improvingOverall Patient Progress: improving    Outcome Evaluation: Pt AAOx4 overnight.  temp of 100.3, on scheduled Tylenol.  Ambulating SBA with Walker.  Tele shows NSR rate of 80. IV NS 50 ml/hr.  Surgical dressing CDI    /73 (BP Location: Left arm)   Pulse 92   Temp 100.3  F (37.9  C) (Oral)   Resp 18   Ht 1.549 m (5' 1\")   Wt 73.2 kg (161 lb 6.4 oz)   SpO2 96%   BMI 30.50 kg/m     "

## 2024-10-02 NOTE — PLAN OF CARE
Problem: Adult Inpatient Plan of Care  Goal: Plan of Care Review  Description: The Plan of Care Review/Shift note should be completed every shift.  The Outcome Evaluation is a brief statement about your assessment that the patient is improving, declining, or no change.  This information will be displayed automatically on your shift  note.  Outcome: Progressing  Flowsheets (Taken 10/2/2024 1295)  Outcome Evaluation: 4 hour shift note. Pt. A&Ox4. VSS on RA. Up in chair for dinner. Pain 4-5/10 and refuses wanting to try oxycodone. Will wait for tylenol. Aromatherapy did help bring pain down to 4/10. Ice to right knee. Redness to old IV site decreasing and patient denies discomfort. Up to BR with SBA, walker, and GB. Tolerated diet.  Plan of Care Reviewed With: patient  Overall Patient Progress: no change

## 2024-10-03 VITALS
OXYGEN SATURATION: 95 % | HEIGHT: 61 IN | HEART RATE: 87 BPM | TEMPERATURE: 98 F | DIASTOLIC BLOOD PRESSURE: 73 MMHG | SYSTOLIC BLOOD PRESSURE: 110 MMHG | BODY MASS INDEX: 30.47 KG/M2 | RESPIRATION RATE: 17 BRPM | WEIGHT: 161.4 LBS

## 2024-10-03 LAB
ANION GAP SERPL CALCULATED.3IONS-SCNC: 14 MMOL/L (ref 7–15)
BUN SERPL-MCNC: 11 MG/DL (ref 8–23)
CALCIUM SERPL-MCNC: 8.6 MG/DL (ref 8.8–10.4)
CHLORIDE SERPL-SCNC: 103 MMOL/L (ref 98–107)
CREAT SERPL-MCNC: 0.59 MG/DL (ref 0.51–0.95)
EGFRCR SERPLBLD CKD-EPI 2021: >90 ML/MIN/1.73M2
ERYTHROCYTE [DISTWIDTH] IN BLOOD BY AUTOMATED COUNT: 12.5 % (ref 10–15)
GLUCOSE SERPL-MCNC: 111 MG/DL (ref 70–99)
HCO3 SERPL-SCNC: 21 MMOL/L (ref 22–29)
HCT VFR BLD AUTO: 28.9 % (ref 35–47)
HGB BLD-MCNC: 10.2 G/DL (ref 11.7–15.7)
MCH RBC QN AUTO: 31.7 PG (ref 26.5–33)
MCHC RBC AUTO-ENTMCNC: 35.3 G/DL (ref 31.5–36.5)
MCV RBC AUTO: 90 FL (ref 78–100)
PLATELET # BLD AUTO: 258 10E3/UL (ref 150–450)
POTASSIUM SERPL-SCNC: 3.5 MMOL/L (ref 3.4–5.3)
RBC # BLD AUTO: 3.22 10E6/UL (ref 3.8–5.2)
SODIUM SERPL-SCNC: 138 MMOL/L (ref 135–145)
WBC # BLD AUTO: 7.3 10E3/UL (ref 4–11)

## 2024-10-03 PROCEDURE — 250N000013 HC RX MED GY IP 250 OP 250 PS 637: Performed by: NURSE PRACTITIONER

## 2024-10-03 PROCEDURE — 80048 BASIC METABOLIC PNL TOTAL CA: CPT | Performed by: NURSE PRACTITIONER

## 2024-10-03 PROCEDURE — 36415 COLL VENOUS BLD VENIPUNCTURE: CPT | Performed by: NURSE PRACTITIONER

## 2024-10-03 PROCEDURE — 99239 HOSP IP/OBS DSCHRG MGMT >30: CPT | Performed by: NURSE PRACTITIONER

## 2024-10-03 PROCEDURE — 85014 HEMATOCRIT: CPT | Performed by: NURSE PRACTITIONER

## 2024-10-03 RX ORDER — ASPIRIN 325 MG
325 TABLET ORAL 2 TIMES DAILY
Status: SHIPPED
Start: 2024-10-03 | End: 2024-10-09

## 2024-10-03 RX ORDER — CEFDINIR 300 MG/1
300 CAPSULE ORAL EVERY 12 HOURS
Qty: 3 CAPSULE | Refills: 0 | Status: SHIPPED | OUTPATIENT
Start: 2024-10-03 | End: 2024-10-05

## 2024-10-03 RX ORDER — ASPIRIN 81 MG/1
81 TABLET ORAL DAILY
Status: SHIPPED
Start: 2024-10-03

## 2024-10-03 RX ORDER — OXYCODONE HYDROCHLORIDE 5 MG/1
2.5 TABLET ORAL EVERY 6 HOURS PRN
COMMUNITY
Start: 2024-10-03

## 2024-10-03 RX ORDER — ACETAMINOPHEN 325 MG/1
975 TABLET ORAL EVERY 8 HOURS
COMMUNITY
Start: 2024-10-03

## 2024-10-03 RX ORDER — KETOROLAC TROMETHAMINE 15 MG/ML
15 INJECTION, SOLUTION INTRAMUSCULAR; INTRAVENOUS EVERY 6 HOURS PRN
Status: DISCONTINUED | OUTPATIENT
Start: 2024-10-03 | End: 2024-10-03 | Stop reason: HOSPADM

## 2024-10-03 RX ADMIN — ACETAMINOPHEN 975 MG: 325 TABLET ORAL at 12:07

## 2024-10-03 RX ADMIN — METOPROLOL SUCCINATE 150 MG: 50 TABLET, EXTENDED RELEASE ORAL at 08:52

## 2024-10-03 RX ADMIN — ASPIRIN 325 MG: 325 TABLET, COATED ORAL at 08:53

## 2024-10-03 RX ADMIN — AMLODIPINE BESYLATE 10 MG: 5 TABLET ORAL at 08:53

## 2024-10-03 RX ADMIN — CEFDINIR 300 MG: 300 CAPSULE ORAL at 08:53

## 2024-10-03 RX ADMIN — ACETAMINOPHEN 975 MG: 325 TABLET ORAL at 04:23

## 2024-10-03 ASSESSMENT — ACTIVITIES OF DAILY LIVING (ADL)
ADLS_ACUITY_SCORE: 37
ADLS_ACUITY_SCORE: 41
ADLS_ACUITY_SCORE: 37
ADLS_ACUITY_SCORE: 41
ADLS_ACUITY_SCORE: 41
ADLS_ACUITY_SCORE: 37
ADLS_ACUITY_SCORE: 41
ADLS_ACUITY_SCORE: 37
ADLS_ACUITY_SCORE: 37

## 2024-10-03 NOTE — DISCHARGE INSTRUCTIONS
Hospital follow up:   Tuesday October 8th at 1:00pm  Dr. Hodges  [facility]   [phone]

## 2024-10-03 NOTE — DISCHARGE SUMMARY
"formerly [facility]  Hospitalist Discharge Summary      Date of Admission:  9/29/2024  Date of Discharge:  10/3/2024  Discharging Provider: Pham Tineo CNP  Discharge Service: Hospitalist Service    Discharge Diagnoses   Metabolic encephalopathy  lethargy  Surgeries  Community-acquired pneumonia  Anemia  Status post right total knee arthroplasty  Hypertension  Untreated MARKUS  Hyperlipidemia  Pulmonary nodule    Clinically Significant Risk Factors     # Obesity: Estimated body mass index is 30.5 kg/m  as calculated from the following:    Height as of this encounter: 1.549 m (5' 1\").    Weight as of this encounter: 73.2 kg (161 lb 6.4 oz).       Follow-ups Needed After Discharge   1.  Follow up with primary care provider, [facility] [place],   within 7 days for hospital follow- up.  The following labs/tests are   recommended: Hemoglobin.  2.  Discuss follow up sleep study with primary care provider  3.  Pulmonary nodule noted incidentally on CT scan, recommend follow-up CT   scan in 18 months-please discuss further with primary care provider          Unresulted Labs Ordered in the Past 30 Days of this Admission       Date and Time Order Name Status Description    9/29/2024 10:12 PM Blood Culture Hand, Right Preliminary         These results will be followed up by hospitalist    Discharge Disposition   Discharged to home  Condition at discharge: Stable    Hospital Course   Greta Watts is a 68 year old female  S/P right total knee replacement (09/25/2024) , MARKUS, hypertension, hyperlipidemia presenting to ED with altered mental status that per the daughters began the day following her knee surgery and has gotten progressively worse.  Patient was sleepy and confused throughout the examination. Information obtained from ED provider note and nurse.     Metabolic encephlopathy  Lethargy.   Atrophy and small vessel changes by head ct in setting of acute illness, opioid pain regimen.  VBG done " pH and co2 wnl.  Vitamin B12 and folate levels within normal limits.  10/1 Occupational Therapy assessed patient and did a SLUMS -she scored a 21 out of 30 indicating mild neurocognitive impairment.  Over the last 24 to 48 hours patient's cognitive abilities have continued to improve, alert oriented to person place time and situation.    -Discharge home with daughter today  -Follow-up with primary care provider within a week  -Home PT OT and RN, consider further outpatient evaluation of cognition      SIRS.  Possible pneumonia.      Tachycardic, mildly elevated lactate on admission.  Given crystalloid with resolution of elevated lactate.  Placed on zosyn on admission (report of possible allergy but we could not find this and she tolerated it).  9/30 Still slightly tachycardic this afternoon however she did not receive beta blocker .  Patchy LISA infiltrate on CT.  9/30 Changed zosyn to ceftriaxone and azithromycin.  She has completed course of azithromycin, and currently on oral cefdinir  Patient has remained afebrile for 24 hours, has not needed any supplemental oxygen.  Denies any respiratory difficulties.    -Continue oral cefdinir for a total of 5 days of treatment.  -Follow-up with primary care provider    Anemia  On admission hemoglobin 11.2.  Follow-up hemoglobin 10/2 morning was 10.2, and has been stable for the last 24 hours.  Patient does not appear to have any signs or symptoms of active bleeding.   -Recommend rechecking hemoglobin at primary care follow-up visit    S/p RTKA.  Minimal swelling.  Does not appear to have infection at incision site.    PT has assessed patient and is recommending discharging home with assist versus TCU.  TCU referrals are pending as is prior authorization.  Patient was unable to obtain prior Auth for TCU therefore planning to discharge to her daughter's home with home care.  Mobility has improved over the last couple of days, patient now standby assist with walker.  She has  not used any opioids for pain, pain mostly controlled with Tylenol and ice as needed  -Discussed with orthopedic surgical team use of ibuprofen if needed for pain control  -Continue Tylenol 3 times a day and slowly taper off as pain improves  -Thigh-high compression stocking to right leg to assist with swelling  -Patient has oxycodone available at home, if she needs to use would recommend using a half a tab (2.5mg)  -continue aspirin 325 mg twice daily for 1 more week  -Home care PT OT and RN  -Follow-up with orthopedic surgeon as previously planned    Essential htn.  Blood pressures have been stable continue home regimen    Untreated MARKUS.   I have advised her that she needs sleep study.  Encouraged to use CPAP while hospitalized  -Recommend outpatient sleep study - will need to be ordered through PCP    HLD. Continue statin    Pulmonary nodule  And 8 mm nodule subpleural right lower lobe posteriorly noted on CT scan  -Recommend follow-up CT scan in 18 months per guidelines    Consultations This Hospital Stay   PHYSICAL THERAPY ADULT IP CONSULT  OCCUPATIONAL THERAPY ADULT IP CONSULT  SOCIAL WORK IP CONSULT  CARE MANAGEMENT / SOCIAL WORK IP CONSULT  PHYSICAL THERAPY ADULT IP CONSULT  OCCUPATIONAL THERAPY ADULT IP CONSULT    Code Status   Full Code    Time Spent on this Encounter   I, Pham Tineo CNP, personally saw the patient today and spent greater than 30 minutes discharging this patient.       Pham Tineo CNP  [facility]  [address] [place] [address]  Phone: [phone]  ______________________________________________________________________    Physical Exam   Vital Signs: Temp: 98  F (36.7  C) Temp src: Oral BP: 110/73 Pulse: 87   Resp: 17 SpO2: 95 % O2 Device: None (Room air)    Weight: 161 lbs 6.4 oz    General Appearance:  Sitting up in chair, no acute distress  Respiratory: Respiratory effort easy at rest, lung sounds clear.  Oxygen per room  air  Cardiovascular: Regular rate and rhythm, no murmur noted  GI: Abdomen soft and nontender with active bowel sounds  Skin: Dressing intact on the right knee.  Bruising noted right knee and right leg, resolving, mild swelling noted  Neuro: Oriented to self,  place, time and situation.  No focal deficits noted       [facility]    Discharge Orders      Home Care Referral      Reason for your hospital stay    You were admitted to the hospital with lethargy and confusion.  Found to have pneumonia, possibly also an adverse reaction to oxycodone after surgery.     Follow-up and recommended labs and tests     1.  [facility] days for hospital follow- up.  The following labs/tests are recommended: Hemoglobin.  2.  Discuss follow up sleep study with primary care provider  3.  Pulmonary nodule noted incidentally on CT scan, recommend follow-up CT scan in 18 months-please discuss further with primary care provider     Activity    Your activity upon discharge: activity as tolerated     Discharge Instructions    1.  Tylenol (acetaminophen) currently scheduled 3 times a day,  can slowly taper off as pain improves.  2.  Please refer to orthopedic surgeon instructions or reach out to orthopedic surgeon regarding use of ibuprofen for pain.  3.  Ice as needed to right knee for pain  4.  Should you need to use stronger pain medication consider using half a dose of the oxycodone to limit side effects  5.  Continue aspirin 325mg 2 times a day for 2 weeks after your surgery (last day 10/9/24), then on 10/10/24 resume previous dose of aspirin 81mg every day.     Diet    Follow this diet upon discharge: Regular       Significant Results and Procedures   Most Recent 3 CBC's:  Recent Labs   Lab Test 10/03/24  0513 10/02/24  1002 10/02/24  0608 10/01/24  0550   WBC 7.3  --  7.3 6.6   HGB 10.2* 10.2* 10.2* 11.1*   MCV 90  --  91 92      --  248 233     Most Recent 3 BMP's:  Recent Labs   Lab Test 10/03/24  0513 10/02/24  0608 10/01/24  0550    138 136   POTASSIUM 3.5 3.5 3.7   CHLORIDE 103 105 102   CO2 21* 21* 21*   BUN 11.0 12.7 10.9   CR 0.59 0.70 0.74   ANIONGAP 14 12 13   ANDRES 8.6* 8.7* 8.6*   * 108* 99   ,   Results for orders placed or performed during the hospital encounter of 09/29/24   Head CT w/o contrast    Narrative    EXAM: CTA HEAD NECK W CONTRAST, CT HEAD W/O CONTRAST  LOCATION: [facility]  DATE: 9/29/2024    INDICATION: AMSX 3 days.  COMPARISON: None.  CONTRAST: 100 mL Isovue 370.  TECHNIQUE: Head and neck CT angiogram with IV contrast. Noncontrast head CT followed by axial helical CT images of the head and neck vessels obtained during the arterial phase of intravenous contrast administration. Axial 2D reconstructed images and   multiplanar 3D MIP reconstructed images of the head and neck vessels were performed by the technologist. Dose reduction techniques were used. All stenosis measurements made according to NASCET criteria unless otherwise specified.    FINDINGS:   NONCONTRAST HEAD CT:   INTRACRANIAL CONTENTS: No intracranial hemorrhage, extraaxial collection, or mass effect. No CT evidence of acute infarct. Mild presumed chronic small vessel ischemic changes. Mild generalized volume loss. No hydrocephalus.     VISUALIZED ORBITS/SINUSES/MASTOIDS: No intraorbital abnormality. No paranasal sinus mucosal disease. No middle ear or mastoid effusion.    BONES/SOFT TISSUES: No acute abnormality.    HEAD CTA:  ANTERIOR CIRCULATION: No stenosis/occlusion, aneurysm, or high flow vascular malformation. Mild atheromatous changes in the carotid siphons. Standard [place] of Dodd anatomy.    POSTERIOR CIRCULATION: No stenosis/occlusion, aneurysm, or high flow vascular malformation. Dominant left and smaller right vertebral artery contribute to a normal basilar artery.     DURAL VENOUS SINUSES:  Expected enhancement of the major dural venous sinuses.    NECK CTA:  RIGHT CAROTID: Mild atheromatous change. No measurable stenosis or dissection.    LEFT CAROTID: Mild atheromatous change. No measurable stenosis or dissection.    VERTEBRAL ARTERIES: No focal stenosis or dissection. Dominant left and smaller right vertebral arteries.    AORTIC ARCH: Classic aortic arch anatomy with no significant stenosis at the origin of the great vessels.    NONVASCULAR STRUCTURES: Unremarkable.      Impression    IMPRESSION:   HEAD CT:  1.  No CT evidence for acute intracranial process.  2.  Brain atrophy and presumed chronic microvascular ischemic changes as above.    HEAD CTA:   1.  No large vessel occlusion or flow-limiting stenosis.  2.  Mild atheromatous changes in the carotid siphons.    NECK CTA:  1.  No measurable stenosis or dissection.   CTA Head Neck with Contrast    Narrative    EXAM: CTA HEAD NECK W CONTRAST, CT HEAD W/O CONTRAST  LOCATION: [facility]  DATE: 9/29/2024    INDICATION: AMSX 3 days.  COMPARISON: None.  CONTRAST: 100 mL Isovue 370.  TECHNIQUE: Head and neck CT angiogram with IV contrast. Noncontrast head CT followed by axial helical CT images of the head and neck vessels obtained during the arterial phase of intravenous contrast administration. Axial 2D reconstructed images and   multiplanar 3D MIP reconstructed images of the head and neck vessels were performed by the technologist. Dose reduction techniques were used. All stenosis measurements made according to NASCET criteria unless otherwise specified.    FINDINGS:   NONCONTRAST HEAD CT:   INTRACRANIAL CONTENTS: No intracranial hemorrhage, extraaxial collection, or mass effect. No CT evidence of acute infarct. Mild presumed chronic small vessel ischemic changes. Mild generalized volume loss. No hydrocephalus.     VISUALIZED ORBITS/SINUSES/MASTOIDS: No intraorbital abnormality. No paranasal sinus mucosal disease. No  middle ear or mastoid effusion.    BONES/SOFT TISSUES: No acute abnormality.    HEAD CTA:  ANTERIOR CIRCULATION: No stenosis/occlusion, aneurysm, or high flow vascular malformation. Mild atheromatous changes in the carotid siphons. Standard [place] anatomy.    POSTERIOR CIRCULATION: No stenosis/occlusion, aneurysm, or high flow vascular malformation. Dominant left and smaller right vertebral artery contribute to a normal basilar artery.     DURAL VENOUS SINUSES: Expected enhancement of the major dural venous sinuses.    NECK CTA:  RIGHT CAROTID: Mild atheromatous change. No measurable stenosis or dissection.    LEFT CAROTID: Mild atheromatous change. No measurable stenosis or dissection.    VERTEBRAL ARTERIES: No focal stenosis or dissection. Dominant left and smaller right vertebral arteries.    AORTIC ARCH: Classic aortic arch anatomy with no significant stenosis at the origin of the great vessels.    NONVASCULAR STRUCTURES: Unremarkable.      Impression    IMPRESSION:   HEAD CT:  1.  No CT evidence for acute intracranial process.  2.  Brain atrophy and presumed chronic microvascular ischemic changes as above.    HEAD CTA:   1.  No large vessel occlusion or flow-limiting stenosis.  2.  Mild atheromatous changes in the carotid siphons.    NECK CTA:  1.  No measurable stenosis or dissection.   CT Chest/Abdomen/Pelvis w Contrast     Value    Radiologist flags Pulmonary nodule    Narrative    EXAM: CT CHEST/ABDOMEN/PELVIS W CONTRAST  LOCATION: [facility]  DATE: 9/29/2024    INDICATION: Altered mental status. Total knee replacement. Strange behavior. Progressive confusion. Evaluate for source.  COMPARISON: None.  TECHNIQUE: CT scan of the chest, abdomen, and pelvis was performed following injection of IV contrast. Multiplanar reformats were obtained. Dose reduction techniques were used.   CONTRAST: 100 mL Isovue 370.    FINDINGS:   LUNGS AND PLEURA: Patchy alveolar infiltrate  posterior left upper lobe compatible with left upper lobe pneumonia. 8 mm nodule subpleural aspect right lower lobe posteriorly (5/186). Minimal linear atelectasis both lung bases.    MEDIASTINUM/AXILLAE: Normal caliber thoracic aorta without dissection. Minimal aortic calcification. Right brachiocephalic, left common carotid, and left subclavian arteries patent. Patent bilateral common carotid and vertebral arteries. No large central   pulmonary embolism. Peripheral pulmonary arteries unable to be evaluated due to phase of contrast opacification. Minimal sliding esophageal hiatal hernia. No pericardial fluid or lymphadenopathy.    CORONARY ARTERY CALCIFICATION: Mild.    HEPATOBILIARY: Normal.    PANCREAS: Normal.    SPLEEN: Normal.    ADRENAL GLANDS: Normal.    KIDNEYS/BLADDER: Symmetric renal enhancement. Symmetric contrast excretion without evidence for obstruction. Bladder unremarkable.    BOWEL: No obstruction or inflammatory change.    LYMPH NODES: No lymphadenopathy.    VASCULATURE: Normal caliber abdominal aorta without evidence for dissection. Patent celiac, SMA, bilateral renal arteries and LAURY. Moderate vascular calcification.    PELVIC ORGANS: 2.8 cm right adnexal cyst. No free fluid.    MUSCULOSKELETAL: Moderate degenerative osteoporosis left hip. Minimal degenerative changes right hip. Marked lower lumbar facet arthropathy.      Impression    IMPRESSION:  1.  Patchy alveolar infiltrate posterior left upper lobe compatible with pneumonia.    2.  Right lower lobe subpleural pulmonary nodule in the posterior costophrenic angle with mean diameter of 8 mm. Continued CT follow-up as detailed below.    3.  Normal caliber thoracic and abdominal aorta without dissection. Branch vessels remain patent.    4.  Minimal sliding esophageal hiatal hernia.    5.  No central pulmonary embolism. Peripheral pulmonary arteries unable to be evaluated due to phase of contrast opacification.    6.  2.8 cm right adnexal cyst.  Management recommendations as detailed below.    REFERENCE:  Guidelines for Management of Incidental Pulmonary Nodules Detected on CT Images: From the Fleischner Society 2017.   Guidelines apply to incidental nodules in patients who are 35 years or older.  Guidelines do not apply to lung cancer screening, patients with immunosuppression, or patients with known primary cancer.    SINGLE NODULE    Nodule size 6-8 mm  Low-risk patients: Follow-up CT at 6-12 months, then consider CT at 18-24 months.  High-risk patients: Follow-up CT at 6-12 months, then at 18-24 months if no change.    REFERENCE:  Management of Incidental Adnexal Findings on CT and MRI: A White Paper of the ACR Incidental Findings Committee. J Am Tamiko Radiol 2020; 17(2):248-254.    Postmenopausal or equal to or >50 years if status unknown:    Equal to or <3 cm: No further imaging.    [Recommend Follow Up: Pulmonary nodule]    This report will be copied to the [facility] to ensure a provider acknowledges the finding.       Echocardiogram Complete     Value    LVEF  60-65%    Narrative    [id number]  [id number]  [id number]  [id number]^JAIDEN^ANURAG^IRMA     [facility]  Echocardiography Laboratory  [address] [place] [address]     Name: DAVID WELCH  MRN: [id number]  : 1955  Study Date: 10/01/2024 08:35 AM  Age: 68 yrs  Gender: Female  Patient Location: Merged with [facility]  Reason For Study: Pulmonary HTN  History: none  Ordering Physician: ANURAG HWANG  Performed By: Radha Espinal     BSA: 1.7 m2  Height: 61 in  Weight: 161 lb  HR: 92  BP: 124/79 mmHg  ______________________________________________________________________________  Procedure  Complete Portable Echo Adult. Patient unable to stay on left side, images  taken and measured at the end.  ______________________________________________________________________________  Interpretation Summary     The left ventricle is normal in size.  There is borderline concentric  left ventricular hypertrophy.  The visual ejection fraction is 60-65%.  The RV was not fully evaluated in this study, but appears normal in structure  and function.  Pulmonary pressures cannot be estimated due to lack of tricuspid  regurgitation, but there is no evidence of pulmonary hypertension (nl RV and  RA size, normal function, lack of TR, nl IVC, nl pulm acceleration time).  No significant valve issues.  The study was technically difficult. There is no comparison study available.  ______________________________________________________________________________  Left Ventricle  The left ventricle is normal in size. There is borderline concentric left  ventricular hypertrophy. The visual ejection fraction is 60-65%. Grade I or  early diastolic dysfunction.     Right Ventricle  The right ventricle is grossly normal size. The RV was not fully evaluated in  this study.     Atria  Normal left atrial size. Right atrial size is normal. There is no atrial shunt  seen.     Mitral Valve  The mitral valve leaflets appear normal. There is no evidence of stenosis,  fluttering, or prolapse. There is trace mitral regurgitation.     Tricuspid Valve  The tricuspid valve is normal in structure and function. No tricuspid  regurgitation. Right ventricular systolic pressure could not be approximated  due to inadequate tricuspid regurgitation. IVC diameter <2.1 cm collapsing  >50% with sniff suggests a normal RA pressure of 3 mmHg.     Aortic Valve  There is mild trileaflet aortic sclerosis. No aortic regurgitation is present.  No aortic stenosis is present.     Pulmonic Valve  The pulmonic valve is normal in structure and function. There is no pulmonic  valvular regurgitation.     Vessels  Normal size aorta.     Pericardium  There is no pericardial effusion.     Rhythm  Sinus rhythm was noted.  ______________________________________________________________________________  MMode/2D Measurements & Calculations     IVSd: 1.2  cm  LVIDd: 3.1 cm  LVIDs: 2.6 cm  LVPWd: 1.2 cm  FS: 18.2 %  LV mass(C)d: 116.6 grams  LV mass(C)dI: 67.7 grams/m2  Ao root diam: 3.4 cm  LA dimension: 3.2 cm  asc Aorta Diam: 3.2 cm  LA/Ao: 0.94  Ao root diam index Ht(cm/m): 2.2  Ao root diam index BSA (cm/m2): 2.0  Asc Ao diam index BSA (cm/m2): 1.9  Asc Ao diam index Ht(cm/m): 2.1  EF Biplane: 69.0 %  LA Volume (BP): 28.9 ml     LA Volume Index (BP): 16.8 ml/m2  RWT: 0.76     Doppler Measurements & Calculations  MV E max raz: 78.8 cm/sec  MV A max raz: 99.0 cm/sec  MV E/A: 0.80  MV dec time: 0.28 sec  Ao V2 max: 124.9 cm/sec  Ao max P.0 mmHg  LV V1 max P.2 mmHg  LV V1 max: 114.4 cm/sec  PA V2 max: 89.7 cm/sec  PA max PG: 3.2 mmHg  PA acc time: 0.14 sec  AV Raz Ratio (DI): 0.92  Medial E/e': 10.3     ______________________________________________________________________________  Report approved by: Maldonado Piedra 10/01/2024 10:16 AM             Discharge Medications   Current Discharge Medication List        START taking these medications    Details   acetaminophen (TYLENOL) 325 MG tablet Take 3 tablets (975 mg) by mouth every 8 hours.      cefdinir (OMNICEF) 300 MG capsule Take 1 capsule (300 mg) by mouth every 12 hours for 3 doses.  Qty: 3 capsule, Refills: 0    Associated Diagnoses: Community acquired pneumonia of left upper lobe of lung           CONTINUE these medications which have CHANGED    Details   aspirin (ASA) 325 MG tablet Take 1 tablet (325 mg) by mouth 2 times daily for 6 days. 1 tab twice daily for 2 weeks then 1 tab once daily until gone    Associated Diagnoses: S/P total knee arthroplasty, right      aspirin 81 MG EC tablet Take 1 tablet (81 mg) by mouth daily.    Associated Diagnoses: S/P total knee arthroplasty, right      oxyCODONE (ROXICODONE) 5 MG tablet Take 0.5 tablets (2.5 mg) by mouth every 6 hours as needed for moderate pain or severe pain.           CONTINUE these medications which have NOT CHANGED    Details    amLODIPine (NORVASC) 10 MG tablet Take 1 tablet by mouth daily.      atorvastatin (LIPITOR) 20 MG tablet Take 1 tablet by mouth at bedtime.      metoprolol succinate ER (TOPROL XL) 100 MG 24 hr tablet Take 150 mg by mouth daily. Take 1.5 tablets (150 mg) daily           STOP taking these medications       hydrOXYzine HCl (ATARAX) 25 MG tablet Comments:   Reason for Stopping:         ibuprofen (ADVIL/MOTRIN) 600 MG tablet Comments:   Reason for Stopping:             Allergies   Allergies   Allergen Reactions    Penicillins Unknown     Patient's daughter states she is allergic but is not sure what the reaction is and the patient states she is not allergic.

## 2024-10-03 NOTE — PROGRESS NOTES
S-(situation): Patient discharged to Home via w/c with Son-in-law.    B-(background): S/P right total knee outside facility. Became confused at home and family brought in. Pneumonia.    A-(assessment): Pt. A&Ox4. VSS on RA. TEDs measured and placed on patient. Pain 5/10 and tylenol given prior to discharge. Dressing C/D/I. Up with SBA and walker. Up to shower this morning. Incontinent briefs changed.    R-(recommendations): Discharge instructions reviewed with patient and called to daughterChasidy. Listed belongings gathered and returned to patient. Room double checked for belongings and nothing left behind.         Discharge Nursing Criteria:   Patient Education given and documented: (STROKE, CHF, Diabetes):  {Yes   Care Plan and Patient education resolved: Yes    New Medications- pt has been educated about purpose and side effects: Yes    Vaccines  Influenza status verified at discharge:  Yes    Intentionally Retained Items (examples: Drains, Wound packing, ureteral stents, sorensen, PICC line or IV)  Patient was sent home with nothing left in place.   Follow up appointment made for removal: No    MISC  Prescriptions if needed, hard copies sent with patient  NA  Home medications returned to patient: NA  Medication Bin checked and emptied on discharge Yes  Patient reports post-discharge pain management plan is effective: Yes

## 2024-10-03 NOTE — PROGRESS NOTES
Care Management Discharge Note    Discharge Date: 10/03/2024       Discharge Disposition: Home Care    Discharge Services: Home Care    Discharge DME: None    Discharge Transportation: family or friend will provide    Private pay costs discussed: Not applicable    Does the patient's insurance plan have a 3 day qualifying hospital stay waiver?  No    PAS Confirmation Code:    Patient/family educated on Medicare website which has current facility and service quality ratings: yes    Education Provided on the Discharge Plan: Yes  Persons Notified of Discharge Plans: patient, daughters Claudia and Chasidy, bedside Rn, interim HC intake Kenan  Patient/Family in Agreement with the Plan: yes    Handoff Referral Completed: Yes, non-[facility] PCP: External handoff communication completed    Additional information:     Per NP at [facility]T rounds pt is medically stable for discharge today.    Patients [employer] insurance called CM and requested a peer to peer with the provider. CM provided the provider with the information for Peer to peer. [employer] DECLINED coverage for TCU. Cm spoke with annika in admissions at [facility] who is now declining patient for TCU and pt will not be able to private pay.  Per Mary Anne garcia liaison for [facility], [place] can take patient private pay for 5K deposit and 350-450k per day.    CM met bedside with pt to discuss plan, per pt family is coming in this afternoon and pt gave CM permission to call her daughter to determine time.    CM called pts daughter Claudia, per Claudia she is sick right now and had pts other daughter Chasidy call CM for plan. CM reviewed options for private pay TCU, homecare or outpatient therapy. Family in agreement with home to [facility] with homecare, family requesting homecare RN be added. Pt accepted with [facility] (phone: [phone] Fax: [phone]) for services. Per Chasidy pts grandmaegan Garcia to provide a ride and requesting any new meds be sent to the pharmacy at hospital. NP  updated and placing orders.  Pt will be going to [facility] in [place]:[address] [place] [address]. Homecare aware of this.    Chasidy to update CM regarding transport time once known. CM to update clinical team and patient on time when known.    CM met bedside to discuss plan with patient, pt in agreement with plan. CM provided pt with the brochure for homecare. Patient denied other questions or needs at this time.    CM updated bedside RN to call pts daughter Chasidy to go over discharge instructions as Claudia is under the weather today.     Plan: [facility] (phone: [phone] Fax: [phone]) RN, PT,OT    Transport: Cirilo Valenzuela RNCM  Care Transitions Registered Nurse  Tele: [phone]

## 2024-10-03 NOTE — PLAN OF CARE
Physical Therapy Discharge Summary    Reason for therapy discharge:    Discharged to home with home therapy.    Progress towards therapy goal(s). See goals on Care Plan in [facility] electronic health record for goal details.  Goals met    Therapy recommendation(s):    Continued therapy is recommended.  Rationale/Recommendations:   .Patient would benefit from continued skilled therapeutic intervention in order to progress them towards a higher level of function in accordance with their surgeon's protocol.        Thank you for your referral.  Maria Isabel Car, PT, DPT, ATC, [facility]  O: [phone]  E: [email or web address]

## 2024-10-03 NOTE — PLAN OF CARE
"Goal Outcome Evaluation:      Plan of Care Reviewed With: patient    Overall Patient Progress: improvingOverall Patient Progress: improving    Outcome Evaluation: Pt A&Ox4. Ambulating with SBA GBW to BR. Scheduled tylenol given. IV occluded and removed, pt refused a new one. Refused oxycodone for pain medication. Pain rated 4-5/10. LSC on RA. Able to make needs known. /79 (BP Location: Left arm)   Pulse 85   Temp 98.8  F (37.1  C) (Oral)   Resp 18   Ht 1.549 m (5' 1\")   Wt 73.2 kg (161 lb 6.4 oz)   SpO2 95%   BMI 30.50 kg/m          "

## 2024-10-04 ENCOUNTER — PATIENT OUTREACH (OUTPATIENT)
Dept: CARE COORDINATION | Facility: CLINIC | Age: 69
End: 2024-10-04
Payer: COMMERCIAL

## 2024-10-04 NOTE — PROGRESS NOTES
[facility]: Transitions of Care Outreach  Chief Complaint   Patient presents with    Clinic Care Coordination - Post Hospital       Most Recent Admission Date: 9/29/2024   Most Recent Admission Diagnosis: Altered mental status, unspecified altered mental status type - R41.82  Community acquired pneumonia of left upper lobe of lung - J18.9  Sepsis, due to unspecified organism, unspecified whether acute organ dysfunction present (H) - A41.9     Most Recent Discharge Date: 10/3/2024   Most Recent Discharge Diagnosis: Community acquired pneumonia of left upper lobe of lung - J18.9  Sepsis, due to unspecified organism, unspecified whether acute organ dysfunction present (H) - A41.9  Altered mental status, unspecified altered mental status type - R41.82  S/P total knee arthroplasty, right - Z96.651     Transitions of Care Assessment    Discharge Assessment  How are you doing now that you are home?: Good.  How are your symptoms? (Red Flag symptoms escalate to triage hotline per guidelines): Improved  Do you know how to contact your clinic care team if you have future questions or changes to your health status? : Yes  Does the patient have their discharge instructions? : Yes  Does the patient have questions regarding their discharge instructions? : No  Were you started on any new medications or were there changes to any of your previous medications? : Yes  Does the patient have all of their medications?: Yes  Do you have questions regarding any of your medications? : No  Do you have all of your needed medical supplies or equipment (DME)?  (i.e. oxygen tank, CPAP, cane, etc.): Yes    Post-op ([facility] CTA Only)  If the patient had a surgery or procedure, do they have any questions for a nurse?: No      Follow up Plan     Discharge Follow-Up  Discharge follow up appointment scheduled in alignment with recommended follow up timeframe or Transitions of Risk Category? (Low = within 30 days; Moderate= within 14  days; High= within 7 days): Yes  Discharge Follow Up Appointment Date: 10/08/24  Discharge Follow Up Appointment Scheduled with?: Primary Care Provider    No future appointments.    Outpatient Plan as outlined on AVS reviewed with patient.    For any urgent concerns, please contact our 24 hour nurse triage line: [phone] ([phone])       GARO Beyer  [phone]  [facility]

## 2024-10-05 LAB — BACTERIA BLD CULT: NO GROWTH
